# Patient Record
Sex: FEMALE | Race: WHITE | URBAN - METROPOLITAN AREA
[De-identification: names, ages, dates, MRNs, and addresses within clinical notes are randomized per-mention and may not be internally consistent; named-entity substitution may affect disease eponyms.]

---

## 2017-01-06 ENCOUNTER — OFFICE VISIT (OUTPATIENT)
Dept: OBGYN | Facility: CLINIC | Age: 42
End: 2017-01-06
Payer: COMMERCIAL

## 2017-01-06 VITALS
OXYGEN SATURATION: 99 % | TEMPERATURE: 97.1 F | SYSTOLIC BLOOD PRESSURE: 112 MMHG | DIASTOLIC BLOOD PRESSURE: 69 MMHG | HEART RATE: 75 BPM | WEIGHT: 169.6 LBS | BODY MASS INDEX: 28.22 KG/M2

## 2017-01-06 DIAGNOSIS — Z98.890 POST-OPERATIVE STATE: Primary | ICD-10-CM

## 2017-01-06 PROCEDURE — 99024 POSTOP FOLLOW-UP VISIT: CPT | Performed by: OBSTETRICS & GYNECOLOGY

## 2017-01-06 RX ORDER — OXYCODONE AND ACETAMINOPHEN 5; 325 MG/1; MG/1
1 TABLET ORAL EVERY 6 HOURS PRN
Qty: 20 TABLET | Refills: 0 | Status: SHIPPED | OUTPATIENT
Start: 2017-01-06 | End: 2017-04-07

## 2017-01-06 NOTE — MR AVS SNAPSHOT
After Visit Summary   1/6/2017    Kimberly Palmer    MRN: [id number]           Patient Information     Date Of Birth          1975        Visit Information        Provider Department      1/6/2017 7:30 AM Emeterio Wilkerson MD [facility]         Follow-ups after your visit        Who to contact     If you have questions or need follow up information about today's clinic visit or your schedule please contact [facility] directly at [phone].  Normal or non-critical lab and imaging results will be communicated to you by [employer]hart, letter or phone within 4 business days after the clinic has received the results. If you do not hear from us within 7 days, please contact the clinic through [employer]hart or phone. If you have a critical or abnormal lab result, we will notify you by phone as soon as possible.  Submit refill requests through [employer] or call your pharmacy and they will forward the refill request to us. Please allow 3 business days for your refill to be completed.          Additional Information About Your Visit        MyChart Information     [employer] gives you secure access to your electronic health record. If you see a primary care provider, you can also send messages to your care team and make appointments. If you have questions, please call your primary care clinic.  If you do not have a primary care provider, please call [phone] and they will assist you.        Care EveryWhere ID     This is your Care EveryWhere ID. This could be used by other organizations to access your [place] medical records  [id number]        Your Vitals Were     Pulse Temperature Pulse Oximetry Last Period          75 97.1  F (36.2  C) (Oral) 99% 12/29/2016         Blood Pressure from Last 3 Encounters:   01/06/17 112/69   11/22/16 114/63   11/18/16 112/72    Weight from Last 3 Encounters:   01/06/17 169 lb 9.6 oz (76.93 kg)   11/16/16 158 lb 9.6 oz (71.94 kg)   11/18/16 161 lb  (73.029 kg)              Today, you had the following     No orders found for display       Primary Care Provider Office Phone # Fax #    Payton Perera PA-C [phone]       [facility] [address] [place] [address]        Thank you!     [facility] you for [facility]  [facility]. Our goal is always to provide you with excellent care. Hearing back from our patients is one way we can continue to improve our services. Please take a few minutes to complete the written survey that you may receive in the mail after your visit with us. Thank you!             Your Updated Medication List - Protect others around you: Learn how to safely use, store and throw away your medicines at www.disposemymeds.org.          This list is accurate as of: 1/6/17  7:39 AM.  Always use your most recent med list.                   Brand Name Dispense Instructions for use    albuterol 108 (90 BASE) MCG/ACT Inhaler    PROAIR HFA/PROVENTIL HFA/VENTOLIN HFA    1 Inhaler    Inhale 2 puffs into the lungs every 6 hours as needed for shortness of breath / dyspnea       ascorbic acid 1000 MG Tabs    vitamin C    30    Take by mouth daily.       calcium + D 600-200 MG-UNIT Tabs   Generic drug:  calcium carbonate-vitamin D     30    1 TABLET DAILY       escitalopram 10 MG tablet    LEXAPRO    180 tablet    Take 2 tablets (20 mg) by mouth daily       fluticasone 50 MCG/ACT spray    FLONASE    1 Package    Spray 1-2 sprays into both nostrils daily       levothyroxine 25 MCG tablet    SYNTHROID/LEVOTHROID    90 tablet    Take 1 tablet (25 mcg) by mouth daily       lidocaine-prilocaine cream    EMLA    30 g    Apply topically as needed for moderate pain       multivitamin per tablet     100    ONE DAILY       oxyCODONE-acetaminophen 5-325 MG per tablet    PERCOCET    30 tablet    Take 1-2 tablets by mouth every 4 hours as needed for pain (moderate to severe) To fill on or after 12/22/16        ranitidine 150 MG tablet    ZANTAC    180 tablet    Take 1 tablet (150 mg) by mouth 2 times daily       VITAMIN D PO      1 TABLET DAILY

## 2017-01-06 NOTE — NURSING NOTE
"Chief Complaint   Patient presents with     Surgical Followup     Wide local excision of the left vulva11/22/2016       Initial /69 mmHg  Pulse 75  Temp(Src) 97.1  F (36.2  C) (Oral)  Wt 169 lb 9.6 oz (76.93 kg)  SpO2 99%  LMP 12/28/2016 (Approximate) Estimated body mass index is 28.22 kg/(m^2) as calculated from the following:    Height as of 11/18/16: 5' 5\" (1.651 m).    Weight as of this encounter: 169 lb 9.6 oz (76.93 kg).  BP completed using cuff size: lynette Mcfadden CMA      "

## 2017-01-06 NOTE — PROGRESS NOTES
Kimberly presents today for her post-operative check.    She complains of still some pain at the lower aspect of the incision.    Incisions: Healing well, well approximated, without signs of infection and no drainage    Assessment:   2 week(s) status post wide local excision of the vulva.  Plan: Warm compresses, ibuprofen.  She reports still needing a little narcotic.  Will prescribe a small refill only   Return as needed.  Emeterio Wilkerson MD FACOG

## 2017-01-16 ENCOUNTER — MYC REFILL (OUTPATIENT)
Dept: FAMILY MEDICINE | Facility: CLINIC | Age: 42
End: 2017-01-16

## 2017-01-16 DIAGNOSIS — Z98.890 POST-OPERATIVE STATE: ICD-10-CM

## 2017-01-16 RX ORDER — OXYCODONE AND ACETAMINOPHEN 5; 325 MG/1; MG/1
1-2 TABLET ORAL EVERY 4 HOURS PRN
Qty: 15 TABLET | Refills: 0 | Status: SHIPPED | OUTPATIENT
Start: 2017-01-16 | End: 2017-04-07

## 2017-01-16 NOTE — TELEPHONE ENCOUNTER
Message from [employer]:  Original authorizing provider: EVELIN Jason S Spencer would like a refill of the following medications:  oxyCODONE-acetaminophen (PERCOCET) 5-325 MG per tablet [Payton Perera PA-C]    Preferred pharmacy: [facility] [address] [place] [address] AT [facility]    Comment:  I have three more boils surfacing, one beside the surgery area in November which is red and inflammed, two more under each of my breast which are just starting to surface but wearing a bra right now is impossible. I have been moist heating them and advil but nothing is helping. I need to set up appointment with you but the next week or so is difficult between our schedules. We may need to set up referrals to get these surgically removed beczuse they will not go away completely.

## 2017-01-16 NOTE — TELEPHONE ENCOUNTER
Small script printed, pt can . I would recommend that she follow up with her Dermatologist as soon as she can.    Payton Perera MPAS, PA-C

## 2017-01-16 NOTE — TELEPHONE ENCOUNTER
Will route to provider to advise.     Please see [employer] messages. Patient has recurring boils-wants a percocet refill for the boil pain, not so much the incisional pain. How would you like to proceed? Would you like to see her or have her surgeon see her? She is unable to come in this week.     Og Daniels RN

## 2017-03-22 ENCOUNTER — TRANSFERRED RECORDS (OUTPATIENT)
Dept: HEALTH INFORMATION MANAGEMENT | Facility: CLINIC | Age: 42
End: 2017-03-22

## 2017-04-07 ENCOUNTER — MYC MEDICAL ADVICE (OUTPATIENT)
Dept: FAMILY MEDICINE | Facility: CLINIC | Age: 42
End: 2017-04-07

## 2017-04-07 DIAGNOSIS — L73.2 AXILLARY HIDRADENITIS SUPPURATIVA: Primary | ICD-10-CM

## 2017-04-07 RX ORDER — OXYCODONE AND ACETAMINOPHEN 5; 325 MG/1; MG/1
1-2 TABLET ORAL EVERY 4 HOURS PRN
Qty: 15 TABLET | Refills: 0 | Status: SHIPPED | OUTPATIENT
Start: 2017-04-07 | End: 2017-05-09

## 2017-04-07 NOTE — TELEPHONE ENCOUNTER
Called patient and let her know that her script will be at  to .    Walked script to .. Logged into book.    Reba Sauceda

## 2017-04-21 ENCOUNTER — OFFICE VISIT (OUTPATIENT)
Dept: FAMILY MEDICINE | Facility: CLINIC | Age: 42
End: 2017-04-21
Payer: COMMERCIAL

## 2017-04-21 VITALS
BODY MASS INDEX: 28.55 KG/M2 | HEIGHT: 65 IN | WEIGHT: 171.38 LBS | SYSTOLIC BLOOD PRESSURE: 116 MMHG | DIASTOLIC BLOOD PRESSURE: 70 MMHG | HEART RATE: 72 BPM | TEMPERATURE: 98.7 F

## 2017-04-21 DIAGNOSIS — R41.3 MEMORY DIFFICULTY: ICD-10-CM

## 2017-04-21 DIAGNOSIS — M79.89 BILATERAL HAND SWELLING: ICD-10-CM

## 2017-04-21 DIAGNOSIS — E03.8 OTHER SPECIFIED HYPOTHYROIDISM: ICD-10-CM

## 2017-04-21 DIAGNOSIS — E11.9 TYPE 2 DIABETES MELLITUS WITHOUT COMPLICATION, WITHOUT LONG-TERM CURRENT USE OF INSULIN (H): ICD-10-CM

## 2017-04-21 DIAGNOSIS — L73.2 HIDRADENITIS SUPPURATIVA: ICD-10-CM

## 2017-04-21 DIAGNOSIS — F33.1 MAJOR DEPRESSIVE DISORDER, RECURRENT EPISODE, MODERATE (H): Primary | ICD-10-CM

## 2017-04-21 LAB
ANION GAP SERPL CALCULATED.3IONS-SCNC: 8 MMOL/L (ref 3–14)
BUN SERPL-MCNC: 12 MG/DL (ref 7–30)
CALCIUM SERPL-MCNC: 8.6 MG/DL (ref 8.5–10.1)
CHLORIDE SERPL-SCNC: 108 MMOL/L (ref 94–109)
CHOLEST SERPL-MCNC: 121 MG/DL
CO2 SERPL-SCNC: 24 MMOL/L (ref 20–32)
CREAT SERPL-MCNC: 0.62 MG/DL (ref 0.52–1.04)
GFR SERPL CREATININE-BSD FRML MDRD: ABNORMAL ML/MIN/1.7M2
GLUCOSE SERPL-MCNC: 270 MG/DL (ref 70–99)
HBA1C MFR BLD: 5.7 % (ref 4.3–6)
HDLC SERPL-MCNC: 38 MG/DL
LDLC SERPL CALC-MCNC: 67 MG/DL
NONHDLC SERPL-MCNC: 83 MG/DL
POTASSIUM SERPL-SCNC: 4.3 MMOL/L (ref 3.4–5.3)
SODIUM SERPL-SCNC: 140 MMOL/L (ref 133–144)
TRIGL SERPL-MCNC: 79 MG/DL
TSH SERPL DL<=0.005 MIU/L-ACNC: 0.46 MU/L (ref 0.4–4)
VIT B12 SERPL-MCNC: 2771 PG/ML (ref 193–986)

## 2017-04-21 PROCEDURE — 80061 LIPID PANEL: CPT | Performed by: PHYSICIAN ASSISTANT

## 2017-04-21 PROCEDURE — 80048 BASIC METABOLIC PNL TOTAL CA: CPT | Performed by: PHYSICIAN ASSISTANT

## 2017-04-21 PROCEDURE — 36415 COLL VENOUS BLD VENIPUNCTURE: CPT | Performed by: PHYSICIAN ASSISTANT

## 2017-04-21 PROCEDURE — 99214 OFFICE O/P EST MOD 30 MIN: CPT | Performed by: PHYSICIAN ASSISTANT

## 2017-04-21 PROCEDURE — 82607 VITAMIN B-12: CPT | Performed by: PHYSICIAN ASSISTANT

## 2017-04-21 PROCEDURE — 83036 HEMOGLOBIN GLYCOSYLATED A1C: CPT | Performed by: PHYSICIAN ASSISTANT

## 2017-04-21 PROCEDURE — 84443 ASSAY THYROID STIM HORMONE: CPT | Performed by: PHYSICIAN ASSISTANT

## 2017-04-21 RX ORDER — OXYCODONE AND ACETAMINOPHEN 5; 325 MG/1; MG/1
1 TABLET ORAL EVERY 6 HOURS PRN
Qty: 20 TABLET | Refills: 0 | Status: SHIPPED | OUTPATIENT
Start: 2017-04-21 | End: 2017-05-08

## 2017-04-21 ASSESSMENT — ANXIETY QUESTIONNAIRES
3. WORRYING TOO MUCH ABOUT DIFFERENT THINGS: SEVERAL DAYS
7. FEELING AFRAID AS IF SOMETHING AWFUL MIGHT HAPPEN: SEVERAL DAYS
GAD7 TOTAL SCORE: 13
6. BECOMING EASILY ANNOYED OR IRRITABLE: MORE THAN HALF THE DAYS
1. FEELING NERVOUS, ANXIOUS, OR ON EDGE: NEARLY EVERY DAY
2. NOT BEING ABLE TO STOP OR CONTROL WORRYING: SEVERAL DAYS
5. BEING SO RESTLESS THAT IT IS HARD TO SIT STILL: MORE THAN HALF THE DAYS

## 2017-04-21 ASSESSMENT — PATIENT HEALTH QUESTIONNAIRE - PHQ9: 5. POOR APPETITE OR OVEREATING: NEARLY EVERY DAY

## 2017-04-21 NOTE — PATIENT INSTRUCTIONS
[facility] will call you to schedule an appointment for ADHD evaluation.  Schedule an appointment with Dermatology.  Payton or her team will be in touch with you with results.  Take a picture of your hands when this happens again!    DANA Juarez, PAJeredC

## 2017-04-21 NOTE — NURSING NOTE
Handed patient 1 printed out prescription for:    oxyCODONE-acetaminophen (PERCOCET) 5-325 MG per tablet    Mari Heller CMA

## 2017-04-21 NOTE — MR AVS SNAPSHOT
After Visit Summary   4/21/2017    Kimberly Palmer    MRN: [id number]           Patient Information     Date Of Birth          1975        Visit Information        Provider Department      4/21/2017 8:20 AM Payton Perera PA-C [facility]        Today's Diagnoses     Major depressive disorder, recurrent episode, moderate (H)    -  1    Hidradenitis suppurativa        Type 2 diabetes mellitus without complication, without long-term current use of insulin (H)        Bilateral hand swelling        Other specified hypothyroidism        Memory difficulty          Care Instructions    [facility] will call you to schedule an appointment for ADHD evaluation.  Schedule an appointment with Dermatology.  Payton or her team will be in touch with you with results.  Take a picture of your hands when this happens again!    DANA Juarez PA-C          Follow-ups after your visit        Additional Services     DERMATOLOGY REFERRAL       Your provider has referred you to: [facility]: Dermatology Clinic [facility] [phone]   [email or web address]  Dr. Deb Miles  [facility]: [facility] [phone]   [email or web address]    Please be aware that coverage of these services is subject to the terms and limitations of your health insurance plan.  Call member services at your health plan with any benefit or coverage questions.      Please bring the following with you to your appointment:    (1) Any X-Rays, CTs or MRIs which have been performed.  Contact the facility where they were done to arrange for  prior to your scheduled appointment.    (2) List of current medications  (3) This referral request   (4) Any documents/labs given to you for this referral            MENTAL HEALTH REFERRAL       Your provider has referred you to: [facility]: Charity [facility]  Services - ADHD & Bariatric Assessments - Adult ADHD Evaluations - [facility] [phone]   [email or web address]   *Please call to schedule an appointment.    All scheduling is subject to the client's specific insurance plan & benefits, provider/location availability, and provider clinical specialities.  Please arrive 15 minutes early for your first appointment and bring your completed paperwork.    Please be aware that coverage of these services is subject to the terms and limitations of your health insurance plan.  Call member services at your health plan with any benefit or coverage questions.                  Who to contact     If you have questions or need follow up information about today's clinic visit or your schedule please contact [facility] directly at [phone].  Normal or non-critical lab and imaging results will be communicated to you by [employer], letter or phone within 4 business days after the clinic has received the results. If you do not hear from us within 7 days, please contact the clinic through [employer] or phone. If you have a critical or abnormal lab result, we will notify you by phone as soon as possible.  Submit refill requests through [employer] or call your pharmacy and they will forward the refill request to us. Please allow 3 business days for your refill to be completed.          Additional Information About Your Visit        [employer] Information     [employer] gives you secure access to your electronic health record. If you see a primary care provider, you can also send messages to your care team and make appointments. If you have questions, please call your primary care clinic.  If you do not have a primary care provider, please call [phone] and they will assist you.        Care EveryWhere ID     This is your Care EveryWhere ID. This could be used by other organizations to access your [place]  "medical records  [id number]        Your Vitals Were     Pulse Temperature Height BMI (Body Mass Index)          72 98.7  F (37.1  C) (Oral) 5' 5\" (1.651 m) 28.52 kg/m2         Blood Pressure from Last 3 Encounters:   04/21/17 116/70   01/06/17 112/69   11/22/16 114/63    Weight from Last 3 Encounters:   04/21/17 171 lb 6 oz (77.7 kg)   01/06/17 169 lb 9.6 oz (76.9 kg)   11/16/16 158 lb 9.6 oz (71.9 kg)              We Performed the Following     Basic metabolic panel  (Ca, Cl, CO2, Creat, Gluc, K, Na, BUN)     DERMATOLOGY REFERRAL     HEMOGLOBIN A1C     Lipid panel reflex to direct LDL     MENTAL HEALTH REFERRAL     TSH with free T4 reflex     Vitamin B12          Today's Medication Changes          These changes are accurate as of: 4/21/17  9:16 AM.  If you have any questions, ask your nurse or doctor.               These medicines have changed or have updated prescriptions.        Dose/Directions    * oxyCODONE-acetaminophen 5-325 MG per tablet   Commonly known as:  PERCOCET   This may have changed:  Another medication with the same name was added. Make sure you understand how and when to take each.   Used for:  Axillary hidradenitis suppurativa   Changed by:  Payton Perera PA-C        Dose:  1-2 tablet   Take 1-2 tablets by mouth every 4 hours as needed for pain (moderate to severe)   Quantity:  15 tablet   Refills:  0       * oxyCODONE-acetaminophen 5-325 MG per tablet   Commonly known as:  PERCOCET   This may have changed:  You were already taking a medication with the same name, and this prescription was added. Make sure you understand how and when to take each.   Used for:  Hidradenitis suppurativa   Changed by:  Payton Perera PA-C        Dose:  1 tablet   Take 1 tablet by mouth every 6 hours as needed for pain maximum 4 tablet(s) per day   Quantity:  20 tablet   Refills:  0       * Notice:  This list has 2 medication(s) that are the same as other medications prescribed for you. Read the " directions carefully, and ask your doctor or other care provider to review them with you.         Where to get your medicines      Some of these will need a paper prescription and others can be bought over the counter.  Ask your nurse if you have questions.     Bring a paper prescription for each of these medications     oxyCODONE-acetaminophen 5-325 MG per tablet                Primary Care Provider Office Phone # Fax #    Payton Marialuisa Perera PA-C [phone]       [facility] [address] [facility] [address]        Thank you!     Thank you for choosing [facility]  for your care. Our goal is always to provide you with excellent care. Hearing back from our patients is one way we can continue to improve our services. Please take a few minutes to complete the written survey that you may receive in the mail after your visit with us. Thank you!             Your Updated Medication List - Protect others around you: Learn how to safely use, store and throw away your medicines at www.disposemymeds.org.          This list is accurate as of: 4/21/17  9:16 AM.  Always use your most recent med list.                   Brand Name Dispense Instructions for use    albuterol 108 (90 BASE) MCG/ACT Inhaler    PROAIR HFA/PROVENTIL HFA/VENTOLIN HFA    1 Inhaler    Inhale 2 puffs into the lungs every 6 hours as needed for shortness of breath / dyspnea       ascorbic acid 1000 MG Tabs    vitamin C    30    Take by mouth daily.       calcium + D 600-200 MG-UNIT Tabs   Generic drug:  calcium carbonate-vitamin D     30    1 TABLET DAILY       escitalopram 10 MG tablet    LEXAPRO    180 tablet    Take 2 tablets (20 mg) by mouth daily       fluticasone 50 MCG/ACT spray    FLONASE    1 Package    Spray 1-2 sprays into both nostrils daily       levothyroxine 25 MCG tablet    SYNTHROID/LEVOTHROID    90 tablet    Take 1 tablet (25 mcg) by mouth daily       lidocaine-prilocaine cream     EMLA    30 g    Apply topically as needed for moderate pain       multivitamin per tablet     100    ONE DAILY       * oxyCODONE-acetaminophen 5-325 MG per tablet    PERCOCET    15 tablet    Take 1-2 tablets by mouth every 4 hours as needed for pain (moderate to severe)       * oxyCODONE-acetaminophen 5-325 MG per tablet    PERCOCET    20 tablet    Take 1 tablet by mouth every 6 hours as needed for pain maximum 4 tablet(s) per day       ranitidine 150 MG tablet    ZANTAC    180 tablet    Take 1 tablet (150 mg) by mouth 2 times daily       VITAMIN D PO      1 TABLET DAILY       * Notice:  This list has 2 medication(s) that are the same as other medications prescribed for you. Read the directions carefully, and ask your doctor or other care provider to review them with you.

## 2017-04-21 NOTE — NURSING NOTE
"Chief Complaint   Patient presents with     Depression     Anxiety     Memory issues     Hand Problem     swelling and pain sometimes     Referral     Derm referral       Initial There were no vitals taken for this visit. Estimated body mass index is 28.22 kg/(m^2) as calculated from the following:    Height as of 11/18/16: 5' 5\" (1.651 m).    Weight as of 1/6/17: 169 lb 9.6 oz (76.9 kg).  Medication Reconciliation: complete   Mari Heller CMA      "

## 2017-04-22 ASSESSMENT — PATIENT HEALTH QUESTIONNAIRE - PHQ9: SUM OF ALL RESPONSES TO PHQ QUESTIONS 1-9: 13

## 2017-04-22 ASSESSMENT — ANXIETY QUESTIONNAIRES: GAD7 TOTAL SCORE: 13

## 2017-05-08 ENCOUNTER — MYC REFILL (OUTPATIENT)
Dept: FAMILY MEDICINE | Facility: CLINIC | Age: 42
End: 2017-05-08

## 2017-05-08 ENCOUNTER — MYC MEDICAL ADVICE (OUTPATIENT)
Dept: FAMILY MEDICINE | Facility: CLINIC | Age: 42
End: 2017-05-08

## 2017-05-08 DIAGNOSIS — L73.2 HIDRADENITIS SUPPURATIVA: ICD-10-CM

## 2017-05-08 NOTE — TELEPHONE ENCOUNTER
Message from [employer]:  Original authorizing provider: EVELIN Jason would like a refill of the following medications:  oxyCODONE-acetaminophen (PERCOCET) 5-325 MG per tablet [Payton Perera PA-C]    Preferred pharmacy: [facility] [address] [place] [address] AT [facility] & [facility]    Comment:  Couple things for Payton, I need these refilled since I had two flare ups last week. And I am still waiting for dermatology to call and schedule an appt. Is there another referral? Also, on one of my test results, my blood sugar was high. Payton thought I was fasting but I had a banana that morning.

## 2017-05-08 NOTE — TELEPHONE ENCOUNTER
Routing refill request to provider for review/approval because:  Drug not on the [facility] refill protocol     Og Daniels RN

## 2017-05-08 NOTE — TELEPHONE ENCOUNTER
oxyCODONE-acetaminophen (PERCOCET) 5-325 MG per tablet         Last Written Prescription Date:  4/21/17  Last Fill Quantity: 20,   # refills: 0  Last Office Visit with [facility], [facility] or [facility] prescribing provider: 4/21/17  Future Office visit:       Routing refill request to provider for review/approval because:  Drug not on the [facility], [facility] or [facility] refill protocol or controlled substance

## 2017-05-09 RX ORDER — OXYCODONE AND ACETAMINOPHEN 5; 325 MG/1; MG/1
1 TABLET ORAL EVERY 6 HOURS PRN
Qty: 20 TABLET | Refills: 0 | Status: SHIPPED | OUTPATIENT
Start: 2017-05-09

## 2017-05-09 NOTE — TELEPHONE ENCOUNTER
Patient here to  envelope, ID verified and envelope handed to patient.      . Margaret Douglas  Patient Representative

## 2017-05-09 NOTE — TELEPHONE ENCOUNTER
Kimberly has been using more Percocet as of late and I don't want her to continue for much longer. This was to be used rarely as needed for severe pain, but it appears that this is being used much more regularly.  I don't see that she has seen Dermatology, and doesn't have any future appointments.       I will refill this 1 last time, but needs to see Dermatology for more definitive treatment as continued percocet is not an option. Script in outbox.    DANA Juarez, PAJerdeC

## 2017-05-19 DIAGNOSIS — E03.8 OTHER SPECIFIED HYPOTHYROIDISM: ICD-10-CM

## 2017-05-19 DIAGNOSIS — F33.1 MAJOR DEPRESSIVE DISORDER, RECURRENT EPISODE, MODERATE (H): ICD-10-CM

## 2017-05-19 DIAGNOSIS — L73.2 HIDRADENITIS SUPPURATIVA: ICD-10-CM

## 2017-05-19 DIAGNOSIS — F41.9 ANXIETY: ICD-10-CM

## 2017-05-19 NOTE — TELEPHONE ENCOUNTER
Pharmacy comments: The patient is requesting authorization to dispense a 90 day supply.    escitalopram (LEXAPRO) 10 MG tablet   20 mg, DAILY 3 ordered  EditCancel Reorder     Summary: Take 2 tablets (20 mg) by mouth daily, Disp-180 tablet, R-3, E-Prescribe   Dose, Route, Frequency: 20 mg, Oral, DAILY  Start: 12/1/2016  Ord/Sold: 12/1/2016 (O)  Report  Taking:   Long-term:   [facility] [address] [place] [address] AT [address] Dose History       Patient Sig: Take 2 tablets (20 mg) by mouth daily       Ordered on: 12/1/2016       Authorized by: LUIS EDUARDO NOYOLA       Dispense: 180 tablet          Last Office Visit with [facility] primary care provider:  4-        Last PHQ-9 score on record=   PHQ-9 SCORE 4/21/2017   Total Score -   Total Score 13           levothyroxine (SYNTHROID/LEVOTHROID) 25 MCG tablet   25 mcg, DAILY 3 ordered  Reorder     Summary: Take 1 tablet (25 mcg) by mouth daily, Disp-90 tablet, R-3, E-Prescribe   Dose, Route, Frequency: 25 mcg, Oral, DAILY  Start: 12/1/2016  Ord/Sold: 12/1/2016 (O)  Report  Taking:   Long-term:   [facility] [employer] [address] [place] [address] AT [address] Dose History  ChangeDiscontinue       Patient Sig: Take 1 tablet (25 mcg) by mouth daily       Ordered on: 12/1/2016       Authorized by: LUIS EDUARDO NOYOLA       Dispense: 90 tablet          Last Office Visit with [facility], [facility] or [facility] prescribing provider: 4-        TSH   Date Value Ref Range Status   04/21/2017 0.46 0.40 - 4.00 mU/L Final         lidocaine-prilocaine (EMLA) cream   PRN 1 ordered  EditCancel Reorder     Summary: Apply topically as needed for moderate pain  Disp-30 g, R-1  E-Prescribe   Dose, Route, Frequency: Topical, PRN  Start: 12/1/2016  Ord/Sold: 12/1/2016 (O)  Report  Taking:   Long-term:   [facility] [address] [place], [place] [address] [address] AT [facility] [place] & [address] [place] [address]  Dose History       Patient Sig: Apply topically as needed for moderate pain       Ordered on: 12/1/2016       Authorized by: LUIS EDUARDO NOYOLA       Dispense: 30 g          Last Office Visit with [facility], [facility] or  [facility] prescribing provider: 4-  Future Office visit:       Routing refill request to provider for review/approval because:  Drug not on the [facility], [facility] or [employer] refill protocol or controlled substance

## 2017-05-22 RX ORDER — ESCITALOPRAM OXALATE 10 MG/1
20 TABLET ORAL DAILY
Qty: 180 TABLET | Refills: 2 | Status: SHIPPED | OUTPATIENT
Start: 2017-05-22

## 2017-05-22 RX ORDER — LEVOTHYROXINE SODIUM 25 UG/1
25 TABLET ORAL DAILY
Qty: 90 TABLET | Refills: 2 | Status: SHIPPED | OUTPATIENT
Start: 2017-05-22

## 2017-05-22 RX ORDER — LIDOCAINE/PRILOCAINE 2.5 %-2.5%
CREAM (GRAM) TOPICAL PRN
Qty: 30 G | Refills: 1 | Status: SHIPPED | OUTPATIENT
Start: 2017-05-22

## 2017-05-22 NOTE — TELEPHONE ENCOUNTER
Patient seen on 4/21/17 do you wish to refill Emla lidocaine cream?   Routing to pcp  lexapro and levothyroxine approved.  Mayuri Rodrigues,Clinic Rn  [facility]

## 2017-07-10 ENCOUNTER — MYC MEDICAL ADVICE (OUTPATIENT)
Dept: FAMILY MEDICINE | Facility: CLINIC | Age: 42
End: 2017-07-10

## 2017-07-10 NOTE — TELEPHONE ENCOUNTER
This needs to be e-visit or phone visit as there is need for medical decision making.   Pt had been referred for ADHD evaluation but it doesn't appear that this has been done.  Please repeat PETERSON-7 and PHQ-9 over the phone or MyChart.    DANA Juarez, PA-C

## 2017-08-01 ENCOUNTER — OFFICE VISIT (OUTPATIENT)
Dept: FAMILY MEDICINE | Facility: CLINIC | Age: 42
End: 2017-08-01
Payer: COMMERCIAL

## 2017-08-01 VITALS
BODY MASS INDEX: 26.82 KG/M2 | HEART RATE: 68 BPM | TEMPERATURE: 98.4 F | DIASTOLIC BLOOD PRESSURE: 60 MMHG | HEIGHT: 65 IN | SYSTOLIC BLOOD PRESSURE: 108 MMHG | WEIGHT: 161 LBS

## 2017-08-01 DIAGNOSIS — Z01.818 PREOP GENERAL PHYSICAL EXAM: Primary | ICD-10-CM

## 2017-08-01 DIAGNOSIS — F32.1 MODERATE MAJOR DEPRESSION (H): ICD-10-CM

## 2017-08-01 PROCEDURE — 99215 OFFICE O/P EST HI 40 MIN: CPT | Performed by: PHYSICIAN ASSISTANT

## 2017-08-01 NOTE — PROGRESS NOTES
[address] [id number] [phone]  Dept: [phone]    PRE-OP EVALUATION:  Today's date: 2017    Kimberly Palmer (: 1975) presents for pre-operative evaluation assessment as requested by Dr. Vishal Philip.  He requires evaluation and anesthesia risk assessment prior to undergoing surgery/procedure for treatment of excess skin- post gastric bypass .  Proposed procedure: Removal of excess skin due to weight loss    Date of Surgery/ Procedure: 17  Time of Surgery/ Procedure: 7:30am  [place] [facility] [place] for surgical facility: [phone]  Primary Physician: Payton Perera  Type of Anesthesia Anticipated: General    Patient has a Health Care Directive or Living Will:  NO    1. NO - Do you have a history of heart attack, stroke, stent, bypass or surgery on an artery in the head, neck, heart or legs?  2. NO - Do you ever have any pain or discomfort in your chest?  3. NO - Do you have a history of  Heart Failure?  4. NO - Are you troubled by shortness of breath when: walking on the level, up a slight hill or at night?  5. NO - Do you currently have a cold, bronchitis or other respiratory infection?  6. NO - Do you have a cough, shortness of breath or wheezing?  7. NO - Do you sometimes get pains in the calves of your legs when you walk?  8. NO - Do you or anyone in your family have previous history of blood clots?  9. NO - Do you or does anyone in your family have a serious bleeding problem such as prolonged bleeding following surgeries or cuts?  10. NO - Have you ever had problems with anemia or been told to take iron pills?  11. NO - Have you had any abnormal blood loss such as black, tarry or bloody stools, or abnormal vaginal bleeding?  12. NO - Have you ever had a blood transfusion?  13. NO - Have you or any of your relatives ever had problems with anesthesia?  14. NO - Do you have sleep apnea, excessive  snoring or daytime drowsiness?  15. NO - Do you have any prosthetic heart valves?  16. NO - Do you have prosthetic joints?  17. NO - Is there any chance that you may be pregnant?      HPI:                                                      Brief HPI related to upcoming procedure: Had gastric bypass in 2014 and has some excess skin in abdominal area that causes irritation & discomfort. Having procedure to remove this    See problem list for active medical problems.  Problems all longstanding and stable, except as noted/documented.  See ROS for pertinent symptoms related to these conditions.          Hx diabetes but since gastric bypass - no longer on medications and A1c was 5.7% on 4/21/17    Other issues:  Depression  Was taken off Lexapro and switched to Wellbutrin  mg on 7/10/17 as she didn't feel the Lexapro was effective. Has been on Wellbutrin in the past with good results but side effects. Not noticing any side effects and would like to increase the dose of the Wellbutrin. States she also needs a refill but was given 90 tabs on 7/10/17. Was due to f/u with PCP but has not done this yet.                                                                               .    MEDICAL HISTORY:                                                    Patient Active Problem List    Diagnosis Date Noted     Hypertriglyceridemia 12/06/2011     Priority: High     Type 2 diabetes, HbA1c goal < 7% (H) 06/11/2010     Priority: High     S/P bariatric surgery 11/18/2016     Priority: Medium     Other specified hypothyroidism 10/16/2016     Priority: Medium     Type 2 diabetes mellitus without complication, without long-term current use of insulin (H) 10/10/2016     Priority: Medium     Elevated testosterone level in female 08/02/2013     Priority: Medium     Hirsutism 07/29/2013     Priority: Medium     Acanthosis nigricans 07/29/2013     Priority: Medium     Axillary hidradenitis suppurativa 02/13/2012     Priority: Medium  "    ASCUS on Pap smear 07/05/2011     Priority: Medium     12/1/09 pap ASCUS neg HPV, 7/5/11 pap NIL           Hidradenitis suppurativa 04/11/2011     Priority: Medium     (Problem list name updated by automated process. Provider to review and confirm.)       CARDIOVASCULAR SCREENING; LDL GOAL LESS THAN 100 06/11/2010     Priority: Medium     Contraceptive management 12/01/2009     Priority: Medium     Moderate major depression (H) 02/04/2009     Priority: Medium     Chronic pancreatitis (H) 06/12/2008     Priority: Medium     Pancreatic \"cyst\" removed       Esophageal reflux      Priority: Medium      Past Medical History:   Diagnosis Date     ASCUS favoring benign 11/4/14    neg HPV     Chronic pancreatitis (H)     history     Depressive disorder 2001     Esophageal reflux      Hypertension      Other specified hypothyroidism 10/16/2016     Type II or unspecified type diabetes mellitus without mention of complication, not stated as uncontrolled 2004     Past Surgical History:   Procedure Laterality Date     BIOPSY OF SKIN LESION       CHOLECYSTECTOMY, OPEN  2001     EXCISE VULVA WIDE LOCAL Left 11/22/2016    Procedure: EXCISE VULVA WIDE LOCAL;  Surgeon: Emeterio Wilkerson MD;  Location: [facility] OR     NO HISTORY OF SURGERY  9/12/13    derm     SOFT TISSUE SURGERY  02/16/2012    Excision of bilateral hidradenitis     SURGICAL HISTORY OF -   2001    pancreatic cyst removal     Current Outpatient Prescriptions   Medication Sig Dispense Refill     buPROPion (WELLBUTRIN XL) 150 MG 24 hr tablet Take 1 tablet (150 mg) by mouth every morning 90 tablet 0     levothyroxine (SYNTHROID/LEVOTHROID) 25 MCG tablet Take 1 tablet (25 mcg) by mouth daily 90 tablet 2     lidocaine-prilocaine (EMLA) cream Apply topically as needed for moderate pain 30 g 1     oxyCODONE-acetaminophen (PERCOCET) 5-325 MG per tablet Take 1 tablet by mouth every 6 hours as needed for pain maximum 2 tablet(s) per day 20 tablet 0     ranitidine (ZANTAC) 150 MG " tablet Take 1 tablet (150 mg) by mouth 2 times daily 180 tablet 3     fluticasone (FLONASE) 50 MCG/ACT nasal spray Spray 1-2 sprays into both nostrils daily 1 Package 11     albuterol (PROAIR HFA, PROVENTIL HFA, VENTOLIN HFA) 108 (90 BASE) MCG/ACT inhaler Inhale 2 puffs into the lungs every 6 hours as needed for shortness of breath / dyspnea 1 Inhaler 1     MULTIVITAMINS OR TABS ONE DAILY 100 1 YEAR     VITAMIN C 1000 MG OR TABS Take by mouth daily.  30 0     CALCIUM + D 600-200 MG-UNIT OR TABS 1 TABLET DAILY 30 0     escitalopram (LEXAPRO) 10 MG tablet Take 2 tablets (20 mg) by mouth daily (Patient not taking: Reported on 8/1/2017) 180 tablet 2     OTC products: NSAIDS- uses ibuprofen    Allergies   Allergen Reactions     No Known Drug Allergy       Latex Allergy: NO    Social History   Substance Use Topics     Smoking status: Former Smoker     Packs/day: 1.00     Years: 10.00     Types: Cigarettes     Start date: 1/1/1990     Quit date: 8/1/2011     Smokeless tobacco: Never Used      Comment: 2 cig daily working on it     Alcohol use Yes      Comment: once every 3 mths/4x yr     History   Drug Use No       REVIEW OF SYSTEMS:                                                    C: NEGATIVE for fever, chills, change in weight  I: NEGATIVE for worrisome rashes, moles or lesions  E: NEGATIVE for vision changes or irritation  E/M: NEGATIVE for ear, mouth and throat problems  R: NEGATIVE for significant cough or SOB  B: NEGATIVE for masses, tenderness or discharge  CV: NEGATIVE for chest pain, palpitations or peripheral edema  GI: NEGATIVE for nausea, abdominal pain, heartburn, or change in bowel habits  : NEGATIVE for frequency, dysuria, or hematuria  M: NEGATIVE for significant arthralgias or myalgia  N: NEGATIVE for weakness, dizziness or paresthesias  E: NEGATIVE for temperature intolerance, skin/hair changes  H: NEGATIVE for bleeding problems  P: POSITIVE for depression. NEGATIVE for anxiety, suicidal ideation, or  "hallucinations    EXAM:                                                    /60 (BP Location: Right arm, Patient Position: Chair, Cuff Size: Adult Large)  Pulse 68  Temp 98.4  F (36.9  C) (Oral)  Ht 5' 5\" (1.651 m)  Wt 161 lb (73 kg)  LMP 07/18/2017  BMI 26.79 kg/m2    GENERAL APPEARANCE: healthy, alert and no distress     EYES: EOMI, PERRL     HENT: ear canals and TM's normal and nose and mouth without ulcers or lesions     NECK: no adenopathy, no asymmetry, masses, or scars and thyroid normal to palpation     RESP: lungs clear to auscultation - no rales, rhonchi or wheezes     CV: regular rates and rhythm, normal S1 S2, no S3 or S4 and no murmur, click or rub     ABDOMEN:  soft, nontender, no HSM or masses and bowel sounds normal     MS: extremities normal- no gross deformities noted, no evidence of inflammation in joints, FROM in all extremities.     SKIN: no suspicious lesions or rashes     NEURO: Normal strength and tone, sensory exam grossly normal, mentation intact and speech normal     PSYCH: mentation appears normal. and affect normal/bright     LYMPHATICS: No axillary, cervical, or supraclavicular nodes    DIAGNOSTICS:                                                      Recent Labs   Lab Test  04/21/17   0917  04/21/17   0820  10/10/16   1700 02/04/16 11/04/14   0720  09/10/14   1304   02/14/12   1034   HGB   --    --    --    --    --    --   13.3   --   12.5   PLT   --    --    --    --    --    --   250   --    --    NA  140   --    --    --    --   138   --    < >  141   POTASSIUM  4.3   --    --   4.1   --   3.9   --    < >  3.9   CR  0.62   --    --   0.59   --   0.70   --    < >  0.58   A1C   --   5.7  5.5  5.7   < >  6.3*   --    < >   --     < > = values in this interval not displayed.      IMPRESSION:                                                    Reason for surgery/procedure: excess skin s/p gastric bypass  Diagnosis/reason for consult: Excess skin removal    The proposed " "surgical procedure is considered INTERMEDIATE risk.    REVISED CARDIAC RISK INDEX  The patient has the following serious cardiovascular risks for perioperative complications such as (MI, PE, VFib and 3  AV Block):  No serious cardiac risks  INTERPRETATION: 0 risks: Class I (very low risk - 0.4% complication rate)    The patient has the following additional risks for perioperative complications:  No identified additional risks      ICD-10-CM    1. Preop general physical exam Z01.818    2. Moderate major depression (H) F32.1      Discussed that it often takes 4-6 weeks to see full effect of antidepressants and given than patient started this medication 3 weeks ago she needs to give it more time or we won't know if current dosage is effective. This will also help avoid side effects from Wellbutrin which she has experienced in the past. Patient was upset stating \"I know my body and I know it's not going to work at this dose\", but then stated \"It's fine\". I offered to help her set up an appt or E-visit with her PCP in a couple of weeks to revisit this issue but she declines. Patient shut down and would not discuss the issue further. Will inform PCP to see if she wants to do anything differently. Patient is not due for a refill as she was given a 90 day supply.    RECOMMENDATIONS:                                                      --Patient is to take all scheduled medications on the day of surgery EXCEPT for modifications listed below.  No ibuprofen 24 hours prior.    APPROVAL GIVEN to proceed with proposed procedure, without further diagnostic evaluation      Signed Electronically by: Daylin Aguila PA-C    Copy of this evaluation report is provided to requesting physician.    [facility] Preop Guidelines  "

## 2017-08-01 NOTE — NURSING NOTE
"Chief Complaint   Patient presents with     Pre-Op Exam     DOS: 8/7/17     Depression     Wondering about increasing dosage of Wellbutrin?       Initial /60 (BP Location: Right arm, Patient Position: Chair, Cuff Size: Adult Large)  Pulse 68  Temp 98.4  F (36.9  C) (Oral)  Ht 5' 5\" (1.651 m)  Wt 161 lb (73 kg)  LMP 07/18/2017  BMI 26.79 kg/m2 Estimated body mass index is 26.79 kg/(m^2) as calculated from the following:    Height as of this encounter: 5' 5\" (1.651 m).    Weight as of this encounter: 161 lb (73 kg).  Medication Reconciliation: complete    "

## 2017-08-01 NOTE — MR AVS SNAPSHOT
After Visit Summary   8/1/2017    Kimberly Palmer    MRN: [id number]           Patient Information     Date Of Birth          1975        Visit Information        Provider Department      8/1/2017 8:00 AM Daylin Aguila PA-C [facility]        Today's Diagnoses     Screening for diabetic peripheral neuropathy    -  1    Preop general physical exam          Care Instructions      Before Your Surgery      Call your surgeon if there is any change in your health. This includes signs of a cold or flu (such as a sore throat, runny nose, cough, rash or fever).    Do not smoke, drink alcohol or take over the counter medicine (unless your surgeon or primary care doctor tells you to) for the 24 hours before and after surgery.    If you take prescribed drugs: Follow your doctor s orders about which medicines to take and which to stop until after surgery.    Eating and drinking prior to surgery: follow the instructions from your surgeon    Take a shower or bath the night before surgery. Use the soap your surgeon gave you to gently clean your skin. If you do not have soap from your surgeon, use your regular soap. Do not shave or scrub the surgery site.  Wear clean pajamas and have clean sheets on your bed.           Follow-ups after your visit        Your next 10 appointments already scheduled     Aug 24, 2017  4:00 PM CDT   New Visit with Ritu Gilmore, PhD   [facility] ([facility])    [address] [place] [id number] [phone]            Aug 31, 2017  4:00 PM CDT   Return Visit with Ritu Gilmore, PhD   [facility] ([facility])    [address] [place] [id number]   [phone]              Who to contact     If you have questions or need follow up information about today's clinic visit or your schedule please contact [place]  [facility] directly at [phone].  Normal or non-critical lab and imaging results will be communicated to you by MyChart, letter or phone within 4 business days after the clinic has received the results. If you do not hear from us within 7 days, please contact the clinic through [employer] or phone. If you have a critical or abnormal lab result, we will notify you by phone as soon as possible.  Submit refill requests through [employer] or call your pharmacy and they will forward the refill request to us. Please allow 3 business days for your refill to be completed.          Additional Information About Your Visit        [employer] Information     [employer] gives you secure access to your electronic health record. If you see a primary care provider, you can also send messages to your care team and make appointments. If you have questions, please call your primary care clinic.  If you do not have a primary care provider, please call [phone] and they will assist you.        Care EveryWhere ID     This is your Care EveryWhere ID. This could be used by other organizations to access your [place] medical records  [id number]        Your Vitals Were     Pulse Temperature Height Last Period BMI (Body Mass Index)       68 98.4  F (36.9  C) (Oral) 5' 5\" (1.651 m) 07/18/2017 26.79 kg/m2        Blood Pressure from Last 3 Encounters:   08/01/17 108/60   04/21/17 116/70   01/06/17 112/69    Weight from Last 3 Encounters:   08/01/17 161 lb (73 kg)   04/21/17 171 lb 6 oz (77.7 kg)   01/06/17 169 lb 9.6 oz (76.9 kg)              Today, you had the following     No orders found for display       Primary Care Provider Office Phone # Fax #    Payton Perera PA-C [phone]       [facility] [address] [facility] [address]        Equal Access to Services     EVERT EASTON AH: Trevin Marshall, watylerda luqadaha, qaybjose chan, yasmin murphy " layadiel lee. So [facility] [phone].    ATENCIÓN: Si habla melody, tiene a delgado disposición servicios gratuitos de asistencia lingüística. Radha hdez [phone].    We comply with applicable federal civil rights laws and [place] laws. We do not discriminate on the basis of race, color, national origin, age, disability sex, sexual orientation or gender identity.            Thank you!     Thank you for choosing [facility]  for your care. Our goal is always to provide you with excellent care. Hearing back from our patients is one way we can continue to improve our services. Please take a few minutes to complete the written survey that you may receive in the mail after your visit with us. Thank you!             Your Updated Medication List - Protect others around you: Learn how to safely use, store and throw away your medicines at www.disposemymeds.org.          This list is accurate as of: 8/1/17  8:37 AM.  Always use your most recent med list.                   Brand Name Dispense Instructions for use Diagnosis    albuterol 108 (90 BASE) MCG/ACT Inhaler    PROAIR HFA/PROVENTIL HFA/VENTOLIN HFA    1 Inhaler    Inhale 2 puffs into the lungs every 6 hours as needed for shortness of breath / dyspnea    Wheezing       ascorbic acid 1000 MG Tabs    vitamin C    30    Take by mouth daily.        buPROPion 150 MG 24 hr tablet    WELLBUTRIN XL    90 tablet    Take 1 tablet (150 mg) by mouth every morning    Major depressive disorder, recurrent episode, moderate (H)       calcium + D 600-200 MG-UNIT Tabs   Generic drug:  calcium carbonate-vitamin D     30    1 TABLET DAILY        escitalopram 10 MG tablet    LEXAPRO    180 tablet    Take 2 tablets (20 mg) by mouth daily    Major depressive disorder, recurrent episode, moderate (H), Anxiety       fluticasone 50 MCG/ACT spray    FLONASE    1 Package    Spray 1-2 sprays into both nostrils daily    Sensation of plugged ear, bilateral       levothyroxine 25 MCG tablet     SYNTHROID/LEVOTHROID    90 tablet    Take 1 tablet (25 mcg) by mouth daily    Other specified hypothyroidism       lidocaine-prilocaine cream    EMLA    30 g    Apply topically as needed for moderate pain    Hidradenitis suppurativa       multivitamin per tablet     100    ONE DAILY        oxyCODONE-acetaminophen 5-325 MG per tablet    PERCOCET    20 tablet    Take 1 tablet by mouth every 6 hours as needed for pain maximum 2 tablet(s) per day    Hidradenitis suppurativa       ranitidine 150 MG tablet    ZANTAC    180 tablet    Take 1 tablet (150 mg) by mouth 2 times daily    Esophageal reflux

## 2017-08-24 ENCOUNTER — FCC EXTENDED DOCUMENTATION (OUTPATIENT)
Dept: PSYCHOLOGY | Facility: CLINIC | Age: 42
End: 2017-08-24

## 2017-08-24 ENCOUNTER — OFFICE VISIT (OUTPATIENT)
Dept: PSYCHOLOGY | Facility: CLINIC | Age: 42
End: 2017-08-24
Payer: COMMERCIAL

## 2017-08-24 DIAGNOSIS — F33.1 MDD (MAJOR DEPRESSIVE DISORDER), RECURRENT EPISODE, MODERATE (H): Primary | ICD-10-CM

## 2017-08-24 PROCEDURE — 90834 PSYTX W PT 45 MINUTES: CPT | Performed by: PSYCHOLOGIST

## 2017-08-24 ASSESSMENT — ANXIETY QUESTIONNAIRES
IF YOU CHECKED OFF ANY PROBLEMS ON THIS QUESTIONNAIRE, HOW DIFFICULT HAVE THESE PROBLEMS MADE IT FOR YOU TO DO YOUR WORK, TAKE CARE OF THINGS AT HOME, OR GET ALONG WITH OTHER PEOPLE: SOMEWHAT DIFFICULT
3. WORRYING TOO MUCH ABOUT DIFFERENT THINGS: MORE THAN HALF THE DAYS
6. BECOMING EASILY ANNOYED OR IRRITABLE: MORE THAN HALF THE DAYS
GAD7 TOTAL SCORE: 14
5. BEING SO RESTLESS THAT IT IS HARD TO SIT STILL: MORE THAN HALF THE DAYS
7. FEELING AFRAID AS IF SOMETHING AWFUL MIGHT HAPPEN: SEVERAL DAYS
1. FEELING NERVOUS, ANXIOUS, OR ON EDGE: MORE THAN HALF THE DAYS
2. NOT BEING ABLE TO STOP OR CONTROL WORRYING: MORE THAN HALF THE DAYS

## 2017-08-24 ASSESSMENT — PATIENT HEALTH QUESTIONNAIRE - PHQ9
5. POOR APPETITE OR OVEREATING: NEARLY EVERY DAY
SUM OF ALL RESPONSES TO PHQ QUESTIONS 1-9: 17

## 2017-08-24 NOTE — PROGRESS NOTES
Progress Note - Initial Session    Client Name:  Kimberly Palmer Date: 8/24/2017         Service Type: Individual/ADHD Eval Intake      Session Start Time: 4:00  Session End Time: 4:52      Session Length: 52 minutes      Session #: 1     Attendees: Client attended alone      Diagnostic Assessment in progress.  Unable to complete documentation at the conclusion of the first session due to gathering extensive information regarding symptom presentation, history, and impact on functioning. Client reported significant history of depression. No risk issues reported.      Mental Status Assessment:  Appearance:   Appropriate   Eye Contact:   Good   Psychomotor Behavior: Normal   Attitude:   Cooperative   Orientation:   All  Speech   Rate / Production: Hyperverbal    Volume:  Normal   Mood:    Normal  Affect:    Appropriate   Thought Content:  Clear   Thought Form:  Coherent  Logical   Insight:    Good       Safety Issues and Plan for Safety and Risk Management:  Client denies current fears or concerns for personal safety.  Client denies current or recent suicidal ideation or behaviors.  Client denies current or recent homicidal ideation or behaviors.  Client denies current or recent self injurious behavior or ideation.  Client denies other safety concerns.  A safety and risk management plan has not been developed at this time, however client was given the after-hours number / 911 should there be a change in any of these risk factors.  Client did not report whether she has access to firearms; will continue to assess at the time of our next session.      Diagnostic Criteria:  A) Recurrent episode(s) - symptoms have been present during the same 2-week period and represent a change from previous functioning 5 or more symptoms (required for diagnosis)   - Depressed mood. Note: In children and adolescents, can be irritable mood.     - Diminished interest or pleasure in all, or almost all, activities.    -  Decreased sleep.    - Psychomotor activity agitation.    - Fatigue or loss of energy.    - Diminished ability to think or concentrate, or indecisiveness.   B) The symptoms cause clinically significant distress or impairment in social, occupational, or other important areas of functioning  C) The episode is not attributable to the physiological effects of a substance or to another medical condition  D) The occurence of major depressive episode is not better explained by other thought / psychotic disorders  E) There has never been a manic episode or hypomanic episode        DSM5 Diagnoses: (Sustained by DSM5 Criteria Listed Above)  Diagnoses: 296.32 (F33.1) Major Depressive Disorder, Recurrent Episode, Moderate With anxious distress  Psychosocial & Contextual Factors: Client reported longstanding history of depression (since 2001). She described having difficulty sleeping since she can remember and reported waking 1-5 times per night. Client explained she has been struggling with motivation and lack of focus and energy.  WHODAS 2.0 (12 item)            This questionnaire asks about difficulties due to health conditions. Health conditions  include  disease or illnesses, other health problems that may be short or long lasting,  injuries, mental health or emotional problems, and problems with alcohol or drugs.                     Think back over the past 30 days and answer these questions, thinking about how much  difficulty you had doing the following activities. For each question, please [place] only  one response.    S1 Standing for long periods such as 30 minutes? None =         1   S2 Taking care of household responsibilities? Severe =       4   S3 Learning a new task, for example, learning how to get to a new place? Moderate =   3   S4 How much of a problem do you have joining community activities (for example, festivals, [employer] or other activities) in the same way as anyone else can? Moderate =   3   S5 How much  have you been emotionally affected by your health problems? Severe =       4     In the past 30 days, how much difficulty did you have in:   S6 Concentrating on doing something for ten minutes? Severe =       4   S7 Walking a long distance such as a kilometer (or equivalent)? None =         1   S8 Washing your whole body? None =         1   S9 Getting dressed? Mild =           2   S10 Dealing with people you do not know? Mild =           2   S11 Maintaining a friendship? Moderate =   3   S12 Your day to day work? Severe =       4     H1 Overall, in the past 30 days, how many days were these difficulties present? Record number of days 20   H2 In the past 30 days, for how many days were you totally unable to carry out your usual activities or work because of any health condition? Record number of days  0   H3 In the past 30 days, not counting the days that you were totally unable, for how many days did you cut back or reduce your usual activities or work because of any health condition? Record number of days 0       Collateral Reports Completed:  Routed note to PCP      PLAN: (Homework, other):  Client RTC next week to complete ADHD DA. She was given self-report and collateral-report measures to complete for our next session.      Ritu Ferrara, PhD, LP

## 2017-08-24 NOTE — Clinical Note
Panchito Cain,  I met with Kimberly today to begin the ADHD evaluation. We will proceed with the assessment and I will be sure to forward the DA and final report to you. Please let me know if you have any questions or concerns.  Thank you for the referral! Ritu Ferrara

## 2017-08-24 NOTE — MR AVS SNAPSHOT
MRN:[id number]                      After Visit Summary   8/24/2017    Kimberly Palmer    MRN: [id number]           Visit Information        Provider Department      8/24/2017 4:00 PM Ritu Ferrara, PhD [facility] ADHD      Your next 10 appointments already scheduled     Aug 31, 2017  4:00 PM CDT   Return Visit with Ritu Ferrara, PhD   [facility] ([facility])    [address] [place] [id number] [phone]              MyChart Information     [employer] gives you secure access to your electronic health record. If you see a primary care provider, you can also send messages to your care team and make appointments. If you have questions, please call your primary care clinic.  If you do not have a primary care provider, please call [phone] and they will assist you.        Care EveryWhere ID     This is your Care EveryWhere ID. This could be used by other organizations to access your [place] medical records  [id number]        Equal Access to Services     EVERT EASTON : Hadii darrin loweryo Sokenny, waaxda luqadaha, qaybta kaalmastephanie adeamy, yasmin azul . So [facility] [phone].    ATENCIÓN: Si habla español, tiene a delgado disposición servicios gratuitos de asistencia lingüística. Llame al [phone].    We comply with applicable federal civil rights laws and [place] laws. We do not discriminate on the basis of race, color, national origin, age, disability sex, sexual orientation or gender identity.

## 2017-08-25 ASSESSMENT — ANXIETY QUESTIONNAIRES: GAD7 TOTAL SCORE: 14

## 2017-08-31 ENCOUNTER — OFFICE VISIT (OUTPATIENT)
Dept: PSYCHOLOGY | Facility: CLINIC | Age: 42
End: 2017-08-31
Payer: COMMERCIAL

## 2017-08-31 ENCOUNTER — DOCUMENTATION ONLY (OUTPATIENT)
Dept: PSYCHOLOGY | Facility: CLINIC | Age: 42
End: 2017-08-31
Payer: COMMERCIAL

## 2017-08-31 DIAGNOSIS — F33.1 MDD (MAJOR DEPRESSIVE DISORDER), RECURRENT EPISODE, MODERATE (H): Primary | ICD-10-CM

## 2017-08-31 DIAGNOSIS — F33.1 MAJOR DEPRESSIVE DISORDER, RECURRENT EPISODE, MODERATE WITH ANXIOUS DISTRESS (H): Primary | ICD-10-CM

## 2017-08-31 PROCEDURE — 96101 HC PSYCHOLOGICAL TEST BY PSYCHOLOGIST/MD, PER HR: CPT | Performed by: PSYCHOLOGIST

## 2017-08-31 PROCEDURE — 90791 PSYCH DIAGNOSTIC EVALUATION: CPT | Performed by: PSYCHOLOGIST

## 2017-08-31 NOTE — Clinical Note
Panchito Cain,  Here is the DA for Kimberly's ADHD evaluation. She meets DSM5 criteria for MDD, Recurrent, Moderate with anxious distress. We will proceed with ADHD testing and I will be sure to route the final report to you. Please let me know if you have any questions or concerns.   Thank you, Ritu Ferrara

## 2017-08-31 NOTE — PROGRESS NOTES
"Name: Kimberly Palmer  MRN: [id number]  : 1975    Jose Adult ADHD Rating Scale-IV: Self and Other Reports (BAARS-IV)  The BAARS-IV assesses for symptoms of ADHD that are experienced in one's daily life. This assessment measure includes self and collateral rating scales designed to provide information regarding current and childhood symptoms of ADHD including inattention, hyperactivity, and impulsivity. Self-report scores are reported as percentiles. Scores at the 76th-83rd percentile are considered marginal, scores at the 84th-92nd percentile are considered borderline, scores at the 93rd-95th percentile are considered mild, scores at the 96th-98th percentile are considered moderate, and those at the 99th percentile are considered severe. Collateral or \"other\" rating scales are reported as number of symptoms observed in comparison to those reported by the client. Norms and percentile scores are not available for collateral reports.      Current Symptoms Scale--Self Report:   Client completed the self-report inventory of current symptoms. The results indicate that the client's Total ADHD Score was 61 which places her in the 99th percentile for overall ADHD symptoms. In addition, the client endorsed the following occur \"often\" or \"very often\": 5/9 (97th percentile) Inattention symptoms, 9/9 (99th percentile) Hyperactivity/Impulsivity symptoms, and 5/9 (94th percentile) Sluggish Cognitive Tempo symptoms. Client indicated that the reported symptoms have resulted in impaired functioning in school, home, work and social relationships. Overall, the results suggest the client is reporting moderate symptoms of inattention and severe symptoms of hyperactivity/impulsivity at this time.      Current Symptoms Scale--Other Report:  Client's partner completed the collateral report inventory of current symptoms. Based on the collateral contact's observation of symptoms, the client demonstrates the following \"often\" or " "\"very often\": 0/9 Inattention symptoms, 0/5 Hyperactivity symptoms, 0/4 Impulsivity symptoms, and 0/9 Sluggish Cognitive Tempo symptoms. The client's Total ADHD Score was 23. The collateral contact did not indicate whether the client demonstrated impaired functioning in any domains. The collateral- and self-report scores are discrepant; Client s partner did not report observing symptoms of inattention or hyperactivity/impulsivity.      Childhood Symptoms Scale--Self-Report:  Client completed the self-report inventory of childhood symptoms. The results indicate that the client's Total ADHD Score was 49 which places her in the 97th percentile for overall ADHD symptoms in childhood. In addition, the client endorsed having experienced the following \"often\" or \"very often\": 4/9 (93rd percentile) Inattention symptoms and 9/9 (99th percentile) Hyperactivity-Impulsivity symptoms. Client indicated that the reported symptoms resulted in impaired functioning in school, home, and social relationships. Overall, the results suggest the client reported experiencing mild symptoms of inattention and severe symptoms of hyperactivity/impulsivity as a child.     Childhood Symptoms Scale--Other Report:  Client was unable to find a family member to complete the collateral report inventory of childhood symptoms.       Jose Functional Impairment Scale: Self and Other Reports (BFIS)  The BFIS is used to assess an individuals' psychosocial impairment in major life/daily activities that may be due to a mental health disorder. This assessment measure includes self and collateral rating scales. Self-report scores are reported as percentiles. Scores at the 76th-83rd percentile are considered marginal, scores at the 84th-92nd percentile are considered borderline, scores at the 93rd-95th percentile are considered mild, scores at the 96th-98th percentile are considered moderate, and those at the 99th percentile are considered severe. Collateral " "or \"other\" rating scales are reported as number of symptoms observed in comparison to those reported by the client. Norms and percentile scores are not available for collateral reports.      Results indicate the client identified impairment (scores at or greater than 93rd percentile) in the following areas: home-chores, social-friends, community activities, and money management. The client's Mean Impairment Score was 4.92 (89th percentile) indicating the client is reporting borderline impairment in functioning across domains. Client's partner completed the collateral rating scale, which indicated discrepant results. The collateral contact's scores were generally lower than the client's report (e.g., Mean Impairment Score of 0.23). Her partner did not note impairment in any domains.      Jose Deficits in Executive Functioning Scale (BDEFS)  The BDEFS is a measure used for evaluating dimensions of adult executive functioning in daily life. This assessment measure includes self and collateral rating scales. Self-report scores are reported as percentiles. Scores at the 76th-83rd percentile are considered marginal, scores at the 84th-92nd percentile are considered borderline, scores at the 93rd-95th percentile are considered mild, scores at the 96th-98th percentile are considered moderate, and those at the 99th percentile are considered severe. Collateral or \"other\" rating scales are reported as number of symptoms observed in comparison to those reported by the client. Norms and percentile scores are not available for collateral reports.      Results indicate the client's Total Executive Functioning Score was 200 (93rd percentile). The ADHD-Executive Functioning Index score was 26 (94th percentile). These scores suggest the client has mild deficits in executive functioning. These deficits may be due to ADHD or another mental health disorder. Results indicate the client identified deficits in the following areas: " self-management to time (moderate), self-motivation (moderate). self-organization (mild), and self-restraint (mild). Client's partner completed the collateral rating scale, which indicated discrepant results. The collateral contact's scores were generally lower than the client's report (i.e., he did not note impairment in any domains).    Generalized Anxiety Disorder Questionnaire (PETERSON-7)  This questionnaire is designed to screen for anxiety in adults. Based on the client's score of 8, she is reporting mild symptoms of anxiety. Client identified the following symptoms of anxiety that occur  over half the days :  feeling on edge/nervous/anxious, being so restless that it s hard to sit still, and becoming easily annoyed or irritable. She identified the following symptoms that occur  several days : difficulty controlling worry and worrying about many different things.     Patient Health Questionnaire- 9 (PHQ-9)   This questionnaire is designed to screen for depression in adults. Based on the client's score of 12, she is reporting moderate symptoms of depression. Client identified the following symptoms of depression: little interest or pleasure in doing things, feeling down/depressed, trouble falling asleep or staying asleep, feeling tired or having little energy, poor appetite or overeating , poor concentration and feeling fidgety/restless.
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
## 2017-08-31 NOTE — MR AVS SNAPSHOT
MRN:[id number]                      After Visit Summary   8/31/2017    Kimberly Palmer    MRN: [id number]           Visit Information        Provider Department      8/31/2017 4:00 PM Ritu Ferrara, PhD [facility] ADHD      MyChart Information     MyChart gives you secure access to your electronic health record. If you see a primary care provider, you can also send messages to your care team and make appointments. If you have questions, please call your primary care clinic.  If you do not have a primary care provider, please call [phone] and they will assist you.        Care EveryWhere ID     This is your Care EveryWhere ID. This could be used by other organizations to access your [facility] medical records  [id number]        Equal Access to Services     EVERT EASTON : Trevin Marshall, wagurpreet tomlin, ivonne chan, yasmin lee. So [facility] [phone].    ATENCIÓN: Si habla español, tiene a delgado disposición servicios gratuitos de asistencia lingüística. Llame al [phone].    We comply with applicable federal civil rights laws and [place] laws. We do not discriminate on the basis of race, color, national origin, age, disability sex, sexual orientation or gender identity.

## 2017-09-08 ENCOUNTER — OFFICE VISIT (OUTPATIENT)
Dept: PSYCHOLOGY | Facility: CLINIC | Age: 42
End: 2017-09-08
Payer: COMMERCIAL

## 2017-09-08 DIAGNOSIS — F33.1 MAJOR DEPRESSIVE DISORDER, RECURRENT EPISODE, MODERATE WITH ANXIOUS DISTRESS (H): Primary | ICD-10-CM

## 2017-09-08 PROCEDURE — 99207 ZZC NO CHARGE LOS: CPT | Performed by: PSYCHOLOGIST

## 2017-09-08 NOTE — MR AVS SNAPSHOT
MRN:[id number]                      After Visit Summary   9/8/2017    Kimberly Palmer    MRN: [id number]           Visit Information        Provider Department      9/8/2017 7:00 AM Ritu Ferrara, PhD [facility] ADHD      MyChart Information     MyChart gives you secure access to your electronic health record. If you see a primary care provider, you can also send messages to your care team and make appointments. If you have questions, please call your primary care clinic.  If you do not have a primary care provider, please call [phone] and they will assist you.        Care EveryWhere ID     This is your Care EveryWhere ID. This could be used by other organizations to access your [facility] medical records  [id number]        Equal Access to Services     EVERT EASTON : Trevin Marshall, wagurpreet tomlin, ivonne chan, yasmin lee. So [facility] [phone].    ATENCIÓN: Si habla español, tiene a delgado disposición servicios gratuitos de asistencia lingüística. Llame al [phone].    We comply with applicable federal civil rights laws and [place] laws. We do not discriminate on the basis of race, color, national origin, age, disability sex, sexual orientation or gender identity.

## 2017-09-12 ENCOUNTER — DOCUMENTATION ONLY (OUTPATIENT)
Dept: PSYCHOLOGY | Facility: CLINIC | Age: 42
End: 2017-09-12
Payer: COMMERCIAL

## 2017-09-12 DIAGNOSIS — F33.1 MAJOR DEPRESSIVE DISORDER, RECURRENT EPISODE, MODERATE WITH ANXIOUS DISTRESS (H): Primary | ICD-10-CM

## 2017-09-12 PROCEDURE — 96101 HC PSYCHOLOGICAL TEST BY PSYCHOLOGIST/MD, PER HR: CPT | Performed by: PSYCHOLOGIST

## 2017-09-12 NOTE — PROGRESS NOTES
Name: Kimberly Palmer  MRN: [id number]  : 1975    Client completed the MMPI-2, a self-report measure of personality and mental health functioning, as part of her evaluation. This test was given to aid in diagnostic clarification. Validity scales indicated the client responded in an open and consistent manner, resulting in a valid profile. The following results are likely to be an accurate reflection of client's current functioning. Her responses reflect moderate levels of emotional distress and disturbance common in outpatient and inpatient psychiatric populations. Individuals with similar profiles experience depression with dysphoric mood with low self-esteem and complaints about psychological inertia and lack of energy for coping with problems. They may experience distractibility, lapses in judgment, problems with memory, low energy and initiative, and a sense of the futility of caring and trying. They may demonstrate a loss of interest, a sense of moral failure or decline, and depletion of energy needed to accomplish mental work. Thinking and problem-solving are experienced as effortful and as subject to going off course even when significant effort is made. They may view their thinking as impaired and unreliable and have the sense that  I can t seem to get my mind to work right.  Individuals with similar profiles likely experience health concerns, fatigue, exhaustion, anxiety, emotional over-control and over-reactiveness to stresses. They may experience lack of drive, energy, interest, and motivation and inability to complete normal tasks and duties. They may demonstrate emotional and behavioral under-control at times.

## 2017-09-14 ENCOUNTER — TRANSFERRED RECORDS (OUTPATIENT)
Dept: HEALTH INFORMATION MANAGEMENT | Facility: CLINIC | Age: 42
End: 2017-09-14

## 2017-09-22 ENCOUNTER — OFFICE VISIT (OUTPATIENT)
Dept: PSYCHOLOGY | Facility: CLINIC | Age: 42
End: 2017-09-22
Payer: COMMERCIAL

## 2017-09-22 DIAGNOSIS — F33.1 MAJOR DEPRESSIVE DISORDER, RECURRENT EPISODE, MODERATE WITH ANXIOUS DISTRESS (H): Primary | ICD-10-CM

## 2017-09-22 PROCEDURE — 96101 ZZHC PSYCHOLOGICAL TEST BY PSYCHOLOGIST/MD, PER HR: CPT | Performed by: PSYCHOLOGIST

## 2017-09-22 NOTE — MR AVS SNAPSHOT
MRN:[id number]                      After Visit Summary   9/22/2017    Kimberly Palmer    MRN: [id number]           Visit Information        Provider Department      9/22/2017 3:00 PM Ritu Ferrara, PhD [facility]      MyChart Information     MyChart gives you secure access to your electronic health record. If you see a primary care provider, you can also send messages to your care team and make appointments. If you have questions, please call your primary care clinic.  If you do not have a primary care provider, please call [phone] and they will assist you.        Care EveryWhere ID     This is your Care EveryWhere ID. This could be used by other organizations to access your [place] medical records  [id number]        Equal Access to Services     EVERT EASTON : Trevin Marshall, wagurpreet tomlin, ivonne chan, yasmin lee. So [facility] [phone].    ATENCIÓN: Si habla español, tiene a delgado disposición servicios gratuitos de asistencia lingüística. Llame al [phone].    We comply with applicable federal civil rights laws and [place] laws. We do not discriminate on the basis of race, color, national origin, age, disability sex, sexual orientation or gender identity.

## 2017-09-22 NOTE — PROGRESS NOTES
Located within [facility]    Wechsler Adult Intelligence Scale--Fourth Edition (WAIS-IV)  Client s intellectual functioning was assessed using the WAIS-IV. This measure provides a Full Scale Score, as well as several index scores measuring specific areas of cognitive ability. Index scores are reported using standard scores which have a mean of 100 and a standard deviation of 15. Subtest scores are reported using scaled scores that have a mean of 10 and a standard deviation of 2. Client s scores are reported at the 95 percent confidence interval, which indicates that there is a 95 percent chance that his true score falls within the stated range.  Results indicate that the FSIQ, a measure of overall intelligence, is the best descriptor of the client s functioning. The client's score on the FSIQ is in the Average range (95% chance ; 58th percentile).     Client s score on the Verbal Comprehension Index (VCI), a measure of verbal concept formation, verbal reasoning, and acquired knowledge, was in the High Average range (95% chance 104-115; 75th percentile). Client s score on the Perceptual Reasoning Index (FABIEN), a measure of perceptual and fluid reasoning, spatial processing, and visual-motor integration, was in the Average range (95% chance ; 45th percentile). Client scored in the Average range (95% chance ; 63rd ercentile) on the Working Memory Index (WMI), a measure of ability to retain information in memory, perform some operation or manipulation, and produce a result. Client s score on the Processing Speed Index (PSI), a measure of short-term visual memory, attention, and visual-motor coordination was in the Average range (95% chance ; 34th percentile). Overall, the client is functioning in the Average range. Her working memory and processing speed scores were commensurate with other indices and her general ability level and do not suggest attention difficulties.     Summary of WAIS-IV  Index Scores   Index  Score Percentile Rank Confidence  Interval* Qualitative Description   Verbal Comprehension Index (VCI) 110 75 104-115 High Average   Perceptual Reasoning Index (FABIEN) 98 45  Average   Working Memory Index (WMI) 105 63  Average   Processing Speed Index (PSI) 94 34  Average   Full Scale IQ (FSIQ) 103 58  Average     Summary of WAIS-IV Scaled Subtest Scores  Verbal Comprehension Perceptual Reasoning   Similarities 13 Block Design 9   Vocabulary 12 Matrix Reasoning 9   Information 11 Visual Puzzles 11   Working Memory Processing Speed   Letter-Number Sequence 10 Coding 8   Arithmetic 12 Symbol Search 10   *Confidence intervals at 95th percentile    Ritu Ferrara, PhD, LP

## 2017-09-23 DIAGNOSIS — L73.2 HIDRADENITIS SUPPURATIVA: ICD-10-CM

## 2017-09-23 DIAGNOSIS — F33.1 MAJOR DEPRESSIVE DISORDER, RECURRENT EPISODE, MODERATE (H): ICD-10-CM

## 2017-09-25 ENCOUNTER — DOCUMENTATION ONLY (OUTPATIENT)
Dept: PSYCHOLOGY | Facility: CLINIC | Age: 42
End: 2017-09-25
Payer: COMMERCIAL

## 2017-09-25 DIAGNOSIS — F33.1 MAJOR DEPRESSIVE DISORDER, RECURRENT EPISODE, MODERATE WITH ANXIOUS DISTRESS (H): Primary | ICD-10-CM

## 2017-09-25 PROCEDURE — 96101 HC PSYCHOLOGICAL TEST BY PSYCHOLOGIST/MD, PER HR: CPT | Performed by: PSYCHOLOGIST

## 2017-09-25 NOTE — TELEPHONE ENCOUNTER
buPROPion (WELLBUTRIN XL) 150 MG 24 hr tablet   150 mg, EVERY MORNING 0 ordered  Edit     Summary: Take 1 tablet (150 mg) by mouth every morning, Disp-90 tablet, R-0, E-Prescribe   Dose, Route, Frequency: 150 mg, Oral, EVERY MORNING  Start: 7/10/2017  Ord/Sold: 7/10/2017 (O)  Report  Taking:   Long-term:   Pharmacy: [facility] [address] [place] [address] AT [address] Dose History       Patient Sig: Take 1 tablet (150 mg) by mouth every morning       Ordered on: 7/10/2017       Authorized by: LUIS EDUARDO NOYOLA       Dispense: 90 tablet          Last Office Visit with [facility] [place] [facility] or [facility] prescribing provider:  8-1-2017        Last PHQ-9 score on record=   PHQ-9 SCORE 8/24/2017   Total Score -   Total Score MyChart -   Total Score 17       Lab Results   Component Value Date    AST 25 02/04/2016     Lab Results   Component Value Date    ALT 27 02/04/2016         lidocaine-prilocaine (EMLA) cream   PRN 1 ordered  Reorder     Summary: Apply topically as needed for moderate pain  Disp-30 g, R-1  E-Prescribe   Dose, Route, Frequency: Topical, PRN  Start: 5/22/2017  Ord/Sold: 5/22/2017 (O)  Report  Taking:   Long-term:   Pharmacy: [facility] [address]  Med Dose History  ChangeDiscontinue       Patient Sig: Apply topically as needed for moderate pain       Ordered on: 5/22/2017       Authorized by: LUIS EDUARDO NOYOLA       Dispense: 30 g          Last Office Visit with [facility] [place], [facility] or [facility] prescribing provider: 8-1-2017       Creatinine   Date Value Ref Range Status   04/21/2017 0.62 0.52 - 1.04 mg/dL Final

## 2017-09-25 NOTE — PROGRESS NOTES
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 likely impede her ability to begin and complete tasks. If sufficiently treated, Client s attention and drive would likely improve. Client will be provided with the results of testing, diagnoses, and recommendations in her last appointment.      DSM5 Diagnoses: (Sustained by DSM5 Criteria Listed Above)    Major Depressive Disorder, Recurrent, Moderate, with anxious distress (F33.1)    Psychosocial & Contextual Factors: Client reported longstanding history of depression (since 2001). She described having difficulty sleeping since she can remember and reported waking 1-5 times per night. Client explained she has been struggling with motivation and lack of focus and energy.  WHODAS 2.0 (12 item) Raw Score: 32        Recommendations:    1. Schedule an appointment with your physician or psychiatrist to discuss a medication evaluation. Medications are often beneficial in treating depression and anxiety symptoms. It is important to take your medication on a consistent basis.    2. Individual therapy is recommended. Therapies focused on identifying and challenging problematic thought and behavior patterns while increasing the use of healthy coping skills have been found to be effective in treating  anxiety and depression. It will be important to set goals in this therapy and work actively toward achieving short-term successes that lead to the completion of each goal. Action-oriented therapies, such as CBT or DBT, are particularly recommended for the treatment of depression.     3. The use of behavioral strategies such as diaphragmatic breathing, guided imagery, and mindfulness is often helpful in the management of anxiety and depressive symptoms.     4. The following compensatory strategies may be useful to cope with reported inattention symptoms:   a. Maintaining a predictable routine and structured environment that incorporates prioritized checklists and reminders (e.g., Post-Its).  b. When completing tasks, try to focus on one task at a time and complete it in its entirety before moving on to the next task.  c. Minimize background distractions when working on complex tasks. For example, TV, radio or ongoing conversations in the background may hinder ability to focus on the task at hand.  d. Take regular breaks from tasks that require prolonged attention. In general, regular breaks from complex tasks can help prevent lapses in attention, which can result in errors.  e. Outline the steps required to complete a task prior to beginning it, which can help ensure an organized approach. Use the outline to refer to throughout the task as a reminder of the steps to be completed.    5. It is recommended to participate in a sleep study (and/or discuss sleep issues with PCP or specialist) to better assess the longstanding issue with maintaining sleep and receive adequate treatment.       Ritu Ferrara, PhD, LP  Licensed Psychologist

## 2017-09-26 RX ORDER — BUPROPION HYDROCHLORIDE 150 MG/1
TABLET ORAL
Qty: 30 TABLET | Refills: 0 | Status: SHIPPED | OUTPATIENT
Start: 2017-09-26 | End: 2017-10-22

## 2017-09-26 RX ORDER — LIDOCAINE/PRILOCAINE 2.5 %-2.5%
CREAM (GRAM) TOPICAL
Qty: 30 G | Refills: 0 | Status: SHIPPED | OUTPATIENT
Start: 2017-09-26

## 2017-09-26 NOTE — TELEPHONE ENCOUNTER
Routing refill request to provider for review/approval because:  Last PHQ 9 was 17, plan unclear for follow up.  EMLA cream is not on protocol.    Og Daniels RN

## 2017-09-27 ENCOUNTER — OFFICE VISIT (OUTPATIENT)
Dept: PSYCHOLOGY | Facility: CLINIC | Age: 42
End: 2017-09-27
Payer: COMMERCIAL

## 2017-09-27 DIAGNOSIS — F33.1 MAJOR DEPRESSIVE DISORDER, RECURRENT EPISODE, MODERATE WITH ANXIOUS DISTRESS (H): Primary | ICD-10-CM

## 2017-09-27 PROCEDURE — 90832 PSYTX W PT 30 MINUTES: CPT | Performed by: PSYCHOLOGIST

## 2017-09-27 NOTE — PROGRESS NOTES
"Progress Note     Client Name: Kimberly Palmer  Date: 9/27/2017      Service Type: Individual (ADHD Evaluation feedback session)      Session Start Time: 12:00  Session End Time: 12:20      Session Length: 20 minutes      Session #: (feedback)     Attendees: Client attended alone         DATA       Treatment Objective(s) Addressed in This Session:   Provided feedback on ADHD evaluation. Reviewed test results in depth and answered client's questions. Client diagnosed with Major Depressive Disorder, Recurrent, Moderate, with anxious distress. It is unclear if Client meets criteria for a sleep disorder at this time but she reports a longstanding issue with maintaining sleep which may also be contributing to her concentration difficulties. For instance, she stated, \"I slept for 45 minutes at a time last night but I kept waking up and I don't know why. My mind was just always racing.\" She expressed interest in working with a specialist to explore her sleep difficulties. This provider also completed full written report of evaluation, including integration of testing data, summary, and recommendations. Please see Documentation Only encounter dated 9/25/17.     Progress on / Status of Treatment Objective(s) / Homework:   Completed      Intervention:  ADHD Evaluation feedback; Reviewed report (can be found in Documentation Only encounter dated 9/25/17) and diagnoses. Client was appreciative of the feedback and expressed understanding of the diagnoses.        ASSESSMENT: Current Emotional / Mental Status (status of significant symptoms):  Risk status (Self / Other harm or suicidal ideation)  Client denies current fears or concerns for personal safety.  Client denies current or recent suicidal ideation or behaviors.  Client denies current or recent homicidal ideation or behaviors.  Client denies current or recent self-injurious behavior or ideation.  Client denies other safety concerns.  A safety and risk management plan has " not been developed at this time, however client was given the after-hours number / 911 should there be a change in any of these risk factors.     Appearance:   Appropriate   Eye Contact:   Good   Psychomotor Behavior: Normal   Attitude:   Cooperative   Orientation:   All  Speech  Rate / Production: Normal   Volume:  Normal   Mood:    Normal  Affect:    Appropriate   Thought Content:  Clear   Thought Form:  Coherent Logical   Insight:    Good      Medication Review:  Client is prescribed Lexapro and Wellbutrin by PCP     Medication Compliance:  Yes     Changes in Health Issues:  None reported     Chemical Use Review:  Substance Use: Chemical use reviewed, no active concerns identified      Tobacco Use: No current tobacco use.      Collateral Reports Completed:  Routed note to Care Team Member(s)     PLAN: (Homework, other)     Recommendations:    1. Schedule an appointment with your physician or psychiatrist to discuss a medication evaluation. Medications are often beneficial in treating depression and anxiety symptoms. It is important to take your medication on a consistent basis.    2. Individual therapy is recommended. Therapies focused on identifying and challenging problematic thought and behavior patterns while increasing the use of healthy coping skills have been found to be effective in treating anxiety and depression. It will be important to set goals in this therapy and work actively toward achieving short-term successes that lead to the completion of each goal. Action-oriented therapies, such as CBT or DBT, are particularly recommended for the treatment of depression.     3. The use of behavioral strategies such as diaphragmatic breathing, guided imagery, and mindfulness is often helpful in the management of anxiety and depressive symptoms.     4. The following compensatory strategies may be useful to cope with reported inattention symptoms:   a. Maintaining a predictable routine and structured environment  that incorporates prioritized checklists and reminders (e.g., Post-Its).  b. When completing tasks, try to focus on one task at a time and complete it in its entirety before moving on to the next task.  c. Minimize background distractions when working on complex tasks. For example, TV, radio or ongoing conversations in the background may hinder ability to focus on the task at hand.  d. Take regular breaks from tasks that require prolonged attention. In general, regular breaks from complex tasks can help prevent lapses in attention, which can result in errors.  e. Outline the steps required to complete a task prior to beginning it, which can help ensure an organized approach. Use the outline to refer to throughout the task as a reminder of the steps to be completed.    5. It is recommended to participate in a sleep study (and/or discuss sleep issues with PCP or specialist) to better assess the longstanding issue with maintaining sleep and receive adequate treatment.       Ritu Ferrara, PhD, LP  Licensed Psychologist

## 2017-09-27 NOTE — Clinical Note
Panchito Cain,  I met with Kimberly to review results of the ADHD evaluation. She does not meet DSM5 criteria for ADHD at this time. She does meet criteria for MDD, Recurrent, Moderate with anxious distress. There is also a possible sleep disorder (it is unclear if sleep difficulties are part of the depression dx or a separate issue) as she reports a longstanding difficulty with maintaining sleep. She expressed intent to meet with you to discuss medication management and let her know that decisions about prescribing stimulant medication would be deferred to you and your level of comfort with doing so. She also expressed interest in working with someone to explore her sleep issues. It is likely that this contributes significantly to her difficulties with concentration. Please let me know if you have any questions or concerns.  Thank you! Ritu Ferrara, PhD, LP

## 2017-09-27 NOTE — MR AVS SNAPSHOT
MRN:[id number]                      After Visit Summary   9/27/2017    Kimberly Palmer    MRN: [id number]           Visit Information        Provider Department      9/27/2017 12:00 PM Ritu Ferrara, PhD [facility] ADHD      MyChart Information     MyChart gives you secure access to your electronic health record. If you see a primary care provider, you can also send messages to your care team and make appointments. If you have questions, please call your primary care clinic.  If you do not have a primary care provider, please call [phone] and they will assist you.        Care EveryWhere ID     This is your Care EveryWhere ID. This could be used by other organizations to access your [facility] medical records  NIP-[id number]        Equal Access to Services     EVERT EASTON : Trevin Marshall, wagurpreet tomlin, ivonne chan, yasmin lee. So [facility] [phone].    ATENCIÓN: Si habla español, tiene a delgado disposición servicios gratuitos de asistencia lingüística. Llame al [phone].    We comply with applicable federal civil rights laws and [place] laws. We do not discriminate on the basis of race, color, national origin, age, disability sex, sexual orientation or gender identity.

## 2017-10-05 ENCOUNTER — MYC MEDICAL ADVICE (OUTPATIENT)
Dept: FAMILY MEDICINE | Facility: CLINIC | Age: 42
End: 2017-10-05

## 2017-10-09 ENCOUNTER — CARE COORDINATION (OUTPATIENT)
Dept: CARE COORDINATION | Facility: CLINIC | Age: 42
End: 2017-10-09

## 2017-10-09 NOTE — LETTER
Health Care Home - Access Care Plan    About Me  Patient Name:  Kimberly Palmer    YOB: 1975  Age:                            41 year old   Charity MRN:         [id number] Telephone Information:     Home Phone [phone]   Mobile [phone]       Address:    [address] [place] [address]  [place] [address] Email address:  [email or web address]      Emergency Contact(s)  Name Relationship Lgl Grd Work Phone Home Phone Mobile Phone   1. YOUSUF CRUM Significant ot*   [phone]              Health Maintenance: Routine Health maintenance Reviewed:   Due/Overdue: eye exam, foot exam, flu vaccine    My Access Plan  Medical Emergency 911   Questions or concerns during clinic hours Primary Clinic Line, I will call the clinic directly: Primary Clinic: [facility] [phone]   24 Hour Appointment Line [phone] or  [phone])  (toll free)   24 Hour Nurse Line [phone] (toll free)   Questions or concerns outside clinic hours 24 Hour Appointment Line, I will call the after-hours on-call line:   [facility] [phone] or [phone] ([phone]) (toll-free)   Preferred Urgent Care Preferred Urgent Care:  (unknown)   Preferred Hospital Preferred Hospital:  (unknown)   Preferred Pharmacy [facility] [address] [place] [address] [place] AT [facility] [place] [facility]     Behavioral Health Crisis Line The National Suicide Prevention Lifeline at 1-267.956.1659 or 911     My Care Team Members  Patient Care Team       Relationship Specialty Notifications Start End    Payton Perera PA-C PCP - General   10/28/09     Phone: [phone] Fax: [phone]         [address] [facility] [address]    Sienna French Clinic Care Coordinator  - Clinical  10/9/17     Phone: [phone] Fax: [phone]            My Medical and Care Information  Problem List   Patient Active Problem List   Diagnosis     Esophageal reflux     Chronic  pancreatitis (H)     Moderate major depression (H)     Contraceptive management     Type 2 diabetes, HbA1c goal < 7% (H)     CARDIOVASCULAR SCREENING; LDL GOAL LESS THAN 100     Hidradenitis suppurativa     ASCUS on Pap smear     Hypertriglyceridemia     Axillary hidradenitis suppurativa     Hirsutism     Acanthosis nigricans     Elevated testosterone level in female     Type 2 diabetes mellitus without complication, without long-term current use of insulin (H)     Other specified hypothyroidism     S/P bariatric surgery

## 2017-10-09 NOTE — TELEPHONE ENCOUNTER
Social work is following up with patient and helping her to get in touch with psych.     Og Daniels RN

## 2017-10-09 NOTE — LETTER
[place] CARE COORDINATION  [facility] [address]  Phone: [phone]      October 9, 2017      Kimberly Palmer  [address] [place] [address]    Dear Kimberly,  I am the Clinic Care Coordinator that works with your primary care provider's clinic. I wanted to thank you for spending the time to talk with me.  Below is a description of what Clinic Care Coordination is and how I can further assist you.     The Clinic Care Coordinator role is a Registered Nurse and/or  who understands the health care system. The goal of Clinic Care Coordination is to help you manage your health and improve access to the [facility] in the most efficient manner.  The Registered Nurse can assist you in meeting your health care goals by providing education, coordinating services, and strengthening the communication among your providers. The  can assist you with financial, behavioral, psychosocial, and chemical dependency and counseling/psychiatric resources.    Please feel free to keep this letter and contact information to contact me at [phone] with any further questions or concerns that may arise. We at [facility] are focused on providing you with the highest-quality healthcare experience possible and that all starts with you.       Sincerely,     Sienna French, FREDY, MSW    Clinical Care Coordination  [facility]  [phone]    Enclosed: I have enclosed a copy of a 24 Hour Access Plan. This has helpful phone numbers for you to call when needed. Please keep this in an easy to access place to use as needed.

## 2017-10-09 NOTE — TELEPHONE ENCOUNTER
Clinic Care Coordination Contact--Social Work Initial Call    Please see new SW encounter.  Will leave open for RN, close as desired.      FREDY Hanson, MSW   [phone]  10/9/2017 1:21 PM

## 2017-10-09 NOTE — TELEPHONE ENCOUNTER
"Called patient to triage as she said \"I am at the end of my rope. I feel like I am done with all of it.\" Patient did not answer, left VM. Huddled with PCP.     Patient called back. I told her my concerns with her [employer] message and she replied \"Yeah well, I can't get a doctor to listen to me or care, I can't get them to prescribe me any medication. I'm done, I'm done with it all. I'm done.\" I asked her if she was having any suicidal thoughts and she hung up. I attempted to call her back but she didn't answer. I then called emergency dispatch, and they will send out an ambulance/police for a welfare check.     Patient will need to be in touch with care coordination due to not having insurance but needing to see psych. Will route to care coordination for resources, and will check on patient later today or tomorrow.    Og Daniels RN    "

## 2017-10-22 DIAGNOSIS — F33.1 MAJOR DEPRESSIVE DISORDER, RECURRENT EPISODE, MODERATE (H): ICD-10-CM

## 2017-10-26 RX ORDER — BUPROPION HYDROCHLORIDE 150 MG/1
TABLET ORAL
Qty: 30 TABLET | Refills: 0 | Status: SHIPPED | OUTPATIENT
Start: 2017-10-26

## 2017-11-07 ENCOUNTER — MYC MEDICAL ADVICE (OUTPATIENT)
Dept: FAMILY MEDICINE | Facility: CLINIC | Age: 42
End: 2017-11-07

## 2017-11-08 ENCOUNTER — CARE COORDINATION (OUTPATIENT)
Dept: CARE COORDINATION | Facility: CLINIC | Age: 42
End: 2017-11-08

## 2017-11-08 NOTE — PROGRESS NOTES
"Clinic Care Coordination Contact--Social Work Initial Call/Assessment  Care Team Conversations     Clinical Data: Per our initial call, Care team and PCP request for SW to contact Patient.  Per 10/5/2017 [employer] message from Patient: \"I am at the end of my rope. I feel like I am done with all of it.\"  RN had asked Patient if she was having any suicidal thoughts and Patient hung up. I attempted to call her back but she didn't answer.  RN called police and requested health and welfare check.       SW called Patient and introduce self and clinic role.  Patient's answers were very short on the phone but she was willing to talk with SW.  Patient sounded anxious on the phone.  SW explained her purpose for calling is to assist with resources that may be helpful to her.  Patient stated \"the RN did not realize my phone cut out and then sent people to my home\".  Patient states she is working today but that her \"better half\" was home.  Patient reports she is \"fine\".  SW inquired if Patient could talk as she is at work, she stated she could.  SW informed the care team requested she call Patient due to concerns that Patient may harm herself, SW pointed out that Patient's earlier comment could be a concerning remark.  The clinic was then unable to reach Patient to clarify.  Patient has diagnosis of moderate major depression.  SW reviewed basic details of Patient's situation with Patient and allowed her to vent her feelings and these were validated.       SW inquired if Patient would like to talk with a therapist.  Patient stated she currently does not have insurance.  SW informed of the [facility] with locations in [place] and [place] or a support group in her area.  Patient is accepting of these resources being sent.  SW verified Patient's address.  Patient expresses frustration with \"being done with all the doctors as no one listens\".  She reports this is what she had mentioned earlier today in her " "conversation with the clinic RN.  SW asked if Patient planned to harm herself, she did not answer.  Patient states the \"doctors do not help, do not listen and no one wants the same thing for her\".  She states this is very frustrating.  SW apologized for Patient's frustration, again allowed her to vent and these feelings were validated.       Patient states \"she needs to find a new doctor that will listen to her\".  SW again apologized for her less than satisfactory experience and offered to assist her to find a new provider.  Patient declined.  Patient reports she must wait until she obtains insurance and reviews those details before choosing a provider.  SW inquired if Patient has emotional support at home, she reports she does.  SW inquired if Patient has others in her life that are emotionally supportive, she reports she does.  SW inquired if Patient has intent to harm herself, she stated \"no\" after some hesitation.  SW asked again and Patient replied \"no\".  SW offered to provide the crisis # and Patient declined.  SW strongly encouraged Patient to call crisis when is crisis.  Patient is accepting of SW following her to assist with supportive resources for her mood.  SW provided her contact information.     SW sent information on Emotions Anonymous meetings near Patient ([place], [place] and [place]--these are either in the evenings or on weekends) and the [facility].    Call to Patient for update and needs assessment.  Patient not available and message left requesting return call.      Plan: 1) SW will call Patient in 1-2 weeks for update and needs assessment if no return call    Sienna French, FREDY, MSW   [phone]  11/8/2017 1:48 PM  "

## 2017-11-24 DIAGNOSIS — F33.1 MAJOR DEPRESSIVE DISORDER, RECURRENT EPISODE, MODERATE (H): ICD-10-CM

## 2017-11-27 ENCOUNTER — MYC REFILL (OUTPATIENT)
Dept: FAMILY MEDICINE | Facility: CLINIC | Age: 42
End: 2017-11-27

## 2017-11-27 DIAGNOSIS — F33.1 MAJOR DEPRESSIVE DISORDER, RECURRENT EPISODE, MODERATE (H): ICD-10-CM

## 2017-11-27 RX ORDER — BUPROPION HYDROCHLORIDE 150 MG/1
TABLET ORAL
Qty: 30 TABLET | Refills: 0 | Status: CANCELLED | OUTPATIENT
Start: 2017-11-27

## 2017-11-29 NOTE — TELEPHONE ENCOUNTER
Routing refill request to provider for review/approval because:  Plan unclear. Last PHQ9 was 17.    Og Daniels RN

## 2017-12-01 ENCOUNTER — MYC MEDICAL ADVICE (OUTPATIENT)
Dept: NURSING | Facility: CLINIC | Age: 42
End: 2017-12-01

## 2017-12-20 ENCOUNTER — CARE COORDINATION (OUTPATIENT)
Dept: CARE COORDINATION | Facility: CLINIC | Age: 42
End: 2017-12-20

## 2017-12-21 NOTE — PROGRESS NOTES
"Clinic Care Coordination Contact-Social Work Chart Review     Situation: Patient chart reviewed by care coordinator.    Background: Care team and PCP request for SW to contact Patient.  Per 10/5/2017 [employer] message from Patient: \"I am at the end of my rope. I feel like I am done with all of it.\"  RN had asked Patient if she was having any suicidal thoughts and Patient hung up. I attempted to call her back but she didn't answer.  RN called police and requested health and welfare check.      Assessment: Patient stated she was not in crisis and was not suicidal, she is quite frustrated with her medical team.  She did not wish for SW to assist her with finding alternate provider     Plan/Recommendations: SW will not make further attempt to contact Patient as in initial call, it was obvious Patient did not wish for assistance.  SW has provided her contact information for future need.  Will update PCP    FREDY Hanson, MSW   [phone]  12/20/2017 6:03 PM        "

## 2017-12-29 RX ORDER — BUPROPION HYDROCHLORIDE 150 MG/1
TABLET ORAL
Qty: 30 TABLET | Refills: 0 | OUTPATIENT
Start: 2017-12-29

## 2018-01-04 ENCOUNTER — MYC MEDICAL ADVICE (OUTPATIENT)
Dept: FAMILY MEDICINE | Facility: CLINIC | Age: 43
End: 2018-01-04

## 2018-01-06 ENCOUNTER — HEALTH MAINTENANCE LETTER (OUTPATIENT)
Age: 43
End: 2018-01-06

## 2019-11-04 ENCOUNTER — HEALTH MAINTENANCE LETTER (OUTPATIENT)
Age: 44
End: 2019-11-04

## 2020-02-16 ENCOUNTER — HEALTH MAINTENANCE LETTER (OUTPATIENT)
Age: 45
End: 2020-02-16

## 2020-11-16 ENCOUNTER — HEALTH MAINTENANCE LETTER (OUTPATIENT)
Age: 45
End: 2020-11-16

## 2021-02-07 ENCOUNTER — HEALTH MAINTENANCE LETTER (OUTPATIENT)
Age: 46
End: 2021-02-07

## 2021-05-29 ENCOUNTER — HEALTH MAINTENANCE LETTER (OUTPATIENT)
Age: 46
End: 2021-05-29

## 2021-09-18 ENCOUNTER — HEALTH MAINTENANCE LETTER (OUTPATIENT)
Age: 46
End: 2021-09-18

## 2021-10-07 NOTE — PROGRESS NOTES
"Clinic Care Coordination Contact--Social Work Initial Call/Assessment  Care Team Conversations    Psychosocial: Care team and PCP request for SW to contact Patient.  Per 10/5/2017 Expect [employer] message from Patient: \"I am at the end of my rope. I feel like I am done with all of it.\"  RN had asked Patient if she was having any suicidal thoughts and Patient hung up. I attempted to call her back but she didn't answer.  RN called police and requested health and welfare check.      SW called Patient and introduce self and clinic role.  Patient's answers were very short on the phone but she was willing to talk with SW.  Patient sounded anxious on the phone.  SW explained her purpose for calling is to assist with resources that may be helpful to her.  Patient stated \"the RN did not realize my phone cut out and then sent people to my home\".  Patient states she is working today but that her \"better half\" was home.  Patient reports she is \"fine\".  SW inquired if Patient could talk as she is at work, she stated she could.  CARLYN informed the care team requested she call Patient due to concerns that Patient may harm herself, SW pointed out that Patient's earlier comment could be a concerning remark.  The clinic was then unable to reach Patient to clarify.  Patient has diagnosis of moderate major depression.  SW reviewed basic details of Patient's situation with Patient and allowed her to vent her feelings and these were validated.      SW inquired if Patient would like to talk with a therapist.  Patient stated she currently does not have insurance.  SW informed of the [facility] with locations in [place] and [place] or a support group in her area.  Patient is accepting of these resources being sent.  CARLYN verified Patient's address.  Patient expresses frustration with \"being done with all the doctors as no one listens\".  She reports this is what she had mentioned earlier today in her conversation with the clinic RN.  " "SW asked if Patient planned to harm herself, she did not answer.  Patient states the \"doctors do not help, do not listen and no one wants the same thing for her\".  She states this is very frustrating.  SW apologized for Patient's frustration, again allowed her to vent and these feelings were validated.      Patient states \"she needs to find a new doctor that will listen to her\".  SW again apologized for her less than satisfactory experience and offered to assist her to find a new provider.  Patient declined.  Patient reports she must wait until she obtains insurance and reviews those details before choosing a provider.  SW inquired if Patient has emotional support at home, she reports she does.  SW inquired if Patient has others in her life that are emotionally supportive, she reports she does.  SW inquired if Patient has intent to harm herself, she stated \"no\" after some hesitation.  SW asked again and Patient replied \"no\".  SW offered to provide the crisis # and Patient declined.  SW strongly encouraged Patient to call crisis when is crisis.  Patient is accepting of SW following her to assist with supportive resources for her mood.  SW provided her contact information.    SW sent information on Emotions [employer] meetings near Patient ([place], [place] and [place]--these are either in the evenings or on weekends) and the [facility].    Plan:   1) Patient will call crisis when in crisis  2) SW will send Patient Care Coordination introduction letter and Access Plan, will call Patient in 1 week for update and needs assessment.    Clinic RN's note indicates they will call Patient in near future for further assessment   3) Will update PCP    Sienna French, FREDY, MSW   [phone]  10/9/2017 2:51 PM    " [facility]: I discussed with the patient the risks of guselkumab including but not limited to immunosuppression, serious infections, and drug reactions.  The patient understands that monitoring is required including a PPD at baseline and must alert us or the primary physician if symptoms of infection or other concerning signs are noted.

## 2022-01-08 ENCOUNTER — HEALTH MAINTENANCE LETTER (OUTPATIENT)
Age: 47
End: 2022-01-08

## 2022-03-05 ENCOUNTER — HEALTH MAINTENANCE LETTER (OUTPATIENT)
Age: 47
End: 2022-03-05

## 2022-08-20 ENCOUNTER — HEALTH MAINTENANCE LETTER (OUTPATIENT)
Age: 47
End: 2022-08-20

## 2022-11-20 ENCOUNTER — HEALTH MAINTENANCE LETTER (OUTPATIENT)
Age: 47
End: 2022-11-20

## 2023-04-15 ENCOUNTER — HEALTH MAINTENANCE LETTER (OUTPATIENT)
Age: 48
End: 2023-04-15

## 2023-11-19 ENCOUNTER — HEALTH MAINTENANCE LETTER (OUTPATIENT)
Age: 48
End: 2023-11-19